# Patient Record
Sex: FEMALE | Race: WHITE | NOT HISPANIC OR LATINO | Employment: UNEMPLOYED | URBAN - METROPOLITAN AREA
[De-identification: names, ages, dates, MRNs, and addresses within clinical notes are randomized per-mention and may not be internally consistent; named-entity substitution may affect disease eponyms.]

---

## 2015-07-02 LAB — HM PAP SMEAR: NORMAL

## 2015-11-03 LAB — HM MAMMOGRAM: NORMAL

## 2017-02-20 ENCOUNTER — TELEPHONE (OUTPATIENT)
Dept: ONCOLOGY | Facility: HOSPITAL | Age: 67
End: 2017-02-20

## 2017-02-20 RX ORDER — ANASTROZOLE 1 MG/1
1 TABLET ORAL DAILY
Qty: 30 TABLET | Refills: 3 | Status: SHIPPED | OUTPATIENT
Start: 2017-02-20 | End: 2017-05-23 | Stop reason: SDUPTHER

## 2017-02-20 NOTE — TELEPHONE ENCOUNTER
----- Message from Amaury Reynolds sent at 2/20/2017  8:51 AM EST -----  Contact: patient   Called refill into pharmacy they have never heard back from us. Only has today's dose left Anastrozole.    Pt needed refill on arimidex. New script sent to [facility] pharmacy. Pt v/u

## 2017-03-30 ENCOUNTER — HOSPITAL ENCOUNTER (OUTPATIENT)
Dept: MAMMOGRAPHY | Facility: HOSPITAL | Age: 67
Discharge: HOME | End: 2017-03-30
Attending: SURGERY | Admitting: SURGERY

## 2017-03-30 ENCOUNTER — TELEPHONE (OUTPATIENT)
Dept: SURGERY | Facility: CLINIC | Age: 67
End: 2017-03-30

## 2017-03-30 DIAGNOSIS — Z80.3 FH: BREAST CANCER: ICD-10-CM

## 2017-03-30 DIAGNOSIS — N64.89 RADIAL SCAR OF BREAST: ICD-10-CM

## 2017-03-30 DIAGNOSIS — C50.311 MALIGNANT NEOPLASM OF LOWER-INNER QUADRANT OF RIGHT FEMALE BREAST (HCC): ICD-10-CM

## 2017-03-30 DIAGNOSIS — N60.12 DIFFUSE CYSTIC MASTOPATHY, LEFT: ICD-10-CM

## 2017-03-30 PROCEDURE — G0202 SCR MAMMO BI INCL CAD: HCPCS

## 2017-03-30 NOTE — TELEPHONE ENCOUNTER
Pt cancelled F/U appt with Dr. Chavez due to her losing ins 4-1-17. She states she will call back when it is reinstated.  walkerg

## 2017-04-03 ENCOUNTER — TELEPHONE (OUTPATIENT)
Dept: SURGERY | Facility: CLINIC | Age: 67
End: 2017-04-03

## 2017-04-11 ENCOUNTER — TELEPHONE (OUTPATIENT)
Dept: ONCOLOGY | Facility: HOSPITAL | Age: 67
End: 2017-04-11

## 2017-04-11 NOTE — TELEPHONE ENCOUNTER
PT CALLING FOR SAMPLES OF ARIMIDEX. SET ASIDE SAMPLE FOR THE PT AND SHE WAS MADE AWARE. PT HAS LOST HER INS COVERAGE AS OF MARCH 31ST. ALSO SUGGESTED THAT PT TALK TO OUR . PT V/U.

## 2017-04-11 NOTE — TELEPHONE ENCOUNTER
----- Message from Kiana Ambriz sent at 4/11/2017  2:17 PM EDT -----   Pt wants to know if we have samples of anastrozole until she can get her insurance back      [phone]

## 2017-05-18 RX ORDER — ALENDRONATE SODIUM 70 MG/1
TABLET ORAL
Qty: 12 TABLET | Refills: 3 | Status: SHIPPED | OUTPATIENT
Start: 2017-05-18 | End: 2017-05-23 | Stop reason: SDUPTHER

## 2017-05-23 ENCOUNTER — LAB (OUTPATIENT)
Dept: LAB | Facility: HOSPITAL | Age: 67
End: 2017-05-23

## 2017-05-23 ENCOUNTER — OFFICE VISIT (OUTPATIENT)
Dept: ONCOLOGY | Facility: CLINIC | Age: 67
End: 2017-05-23

## 2017-05-23 VITALS
SYSTOLIC BLOOD PRESSURE: 110 MMHG | OXYGEN SATURATION: 96 % | DIASTOLIC BLOOD PRESSURE: 78 MMHG | WEIGHT: 187.4 LBS | BODY MASS INDEX: 28.08 KG/M2 | TEMPERATURE: 98.7 F | HEART RATE: 82 BPM

## 2017-05-23 DIAGNOSIS — M85.80 OSTEOPENIA: ICD-10-CM

## 2017-05-23 DIAGNOSIS — C50.311 MALIGNANT NEOPLASM OF LOWER-INNER QUADRANT OF RIGHT FEMALE BREAST (HCC): Primary | ICD-10-CM

## 2017-05-23 DIAGNOSIS — D72.819 CHRONIC LEUKOPENIA: ICD-10-CM

## 2017-05-23 LAB
ALBUMIN SERPL-MCNC: 4.4 G/DL (ref 3.5–5.2)
ALBUMIN/GLOB SERPL: 1.5 G/DL
ALP SERPL-CCNC: 80 U/L (ref 40–129)
ALT SERPL W P-5'-P-CCNC: 44 U/L (ref 5–33)
ANION GAP SERPL CALCULATED.3IONS-SCNC: 12.5 MMOL/L
AST SERPL-CCNC: 36 U/L (ref 5–32)
BASOPHILS # BLD AUTO: 0.01 10*3/MM3 (ref 0–0.2)
BASOPHILS NFR BLD AUTO: 0.2 % (ref 0–2)
BILIRUB SERPL-MCNC: 0.7 MG/DL (ref 0.2–1.2)
BUN BLD-MCNC: 13 MG/DL (ref 8–23)
BUN/CREAT SERPL: 14.6 (ref 7–25)
CALCIUM SPEC-SCNC: 10 MG/DL (ref 8.8–10.5)
CHLORIDE SERPL-SCNC: 98 MMOL/L (ref 98–107)
CHOLEST SERPL-MCNC: 227 MG/DL (ref 0–200)
CO2 SERPL-SCNC: 27.5 MMOL/L (ref 22–29)
CREAT BLD-MCNC: 0.89 MG/DL (ref 0.57–1)
DEPRECATED RDW RBC AUTO: 41.1 FL (ref 37–54)
EOSINOPHIL # BLD AUTO: 0.11 10*3/MM3 (ref 0.1–0.3)
EOSINOPHIL NFR BLD AUTO: 2.5 % (ref 0–4)
ERYTHROCYTE [DISTWIDTH] IN BLOOD BY AUTOMATED COUNT: 12.7 % (ref 11.5–14.5)
GFR SERPL CREATININE-BSD FRML MDRD: 64 ML/MIN/1.73
GLOBULIN UR ELPH-MCNC: 2.9 GM/DL
GLUCOSE BLD-MCNC: 116 MG/DL (ref 65–99)
HCT VFR BLD AUTO: 39.3 % (ref 37–47)
HDLC SERPL-MCNC: 53 MG/DL (ref 40–60)
HGB BLD-MCNC: 13.6 G/DL (ref 12–16)
IMM GRANULOCYTES # BLD: 0.01 10*3/MM3 (ref 0–0.03)
IMM GRANULOCYTES NFR BLD: 0.2 % (ref 0–0.5)
LDLC SERPL CALC-MCNC: 137 MG/DL (ref 0–100)
LDLC/HDLC SERPL: 2.58 {RATIO}
LYMPHOCYTES # BLD AUTO: 1.14 10*3/MM3 (ref 0.6–4.8)
LYMPHOCYTES NFR BLD AUTO: 25.6 % (ref 20–45)
MCH RBC QN AUTO: 30.8 PG (ref 27–31)
MCHC RBC AUTO-ENTMCNC: 34.6 G/DL (ref 31–37)
MCV RBC AUTO: 89.1 FL (ref 81–99)
MONOCYTES # BLD AUTO: 0.34 10*3/MM3 (ref 0–1)
MONOCYTES NFR BLD AUTO: 7.6 % (ref 3–8)
NEUTROPHILS # BLD AUTO: 2.84 10*3/MM3 (ref 1.5–8.3)
NEUTROPHILS NFR BLD AUTO: 63.9 % (ref 45–70)
NRBC BLD MANUAL-RTO: 0 /100 WBC (ref 0–0)
PLATELET # BLD AUTO: 281 10*3/MM3 (ref 140–500)
PMV BLD AUTO: 9.2 FL (ref 7.4–10.4)
POTASSIUM BLD-SCNC: 4.9 MMOL/L (ref 3.5–5.2)
PROT SERPL-MCNC: 7.3 G/DL (ref 6–8.5)
RBC # BLD AUTO: 4.41 10*6/MM3 (ref 4.2–5.4)
SODIUM BLD-SCNC: 138 MMOL/L (ref 136–145)
TRIGL SERPL-MCNC: 186 MG/DL (ref 0–150)
VLDLC SERPL-MCNC: 37.2 MG/DL (ref 7–27)
WBC NRBC COR # BLD: 4.45 10*3/MM3 (ref 4.8–10.8)

## 2017-05-23 PROCEDURE — 80061 LIPID PANEL: CPT

## 2017-05-23 PROCEDURE — 80053 COMPREHEN METABOLIC PANEL: CPT

## 2017-05-23 PROCEDURE — 85025 COMPLETE CBC W/AUTO DIFF WBC: CPT

## 2017-05-23 PROCEDURE — 99213 OFFICE O/P EST LOW 20 MIN: CPT | Performed by: INTERNAL MEDICINE

## 2017-05-23 PROCEDURE — 36415 COLL VENOUS BLD VENIPUNCTURE: CPT

## 2017-05-23 RX ORDER — HYDROCODONE BITARTRATE AND ACETAMINOPHEN 5; 325 MG/1; MG/1
1 TABLET ORAL
COMMUNITY

## 2017-05-23 RX ORDER — ALENDRONATE SODIUM 70 MG/1
70 TABLET ORAL
Qty: 12 TABLET | Refills: 3 | Status: SHIPPED | OUTPATIENT
Start: 2017-05-23

## 2017-05-23 RX ORDER — ANASTROZOLE 1 MG/1
1 TABLET ORAL DAILY
Qty: 90 TABLET | Refills: 3 | Status: SHIPPED | OUTPATIENT
Start: 2017-05-23

## 2017-05-23 RX ORDER — LOVASTATIN 20 MG/1
20 TABLET ORAL
COMMUNITY
End: 2017-07-06

## 2017-06-08 ENCOUNTER — TELEPHONE (OUTPATIENT)
Dept: SURGERY | Facility: CLINIC | Age: 67
End: 2017-06-08

## 2017-07-06 ENCOUNTER — OFFICE VISIT (OUTPATIENT)
Dept: SURGERY | Facility: CLINIC | Age: 67
End: 2017-07-06

## 2017-07-06 VITALS
WEIGHT: 187.6 LBS | OXYGEN SATURATION: 99 % | TEMPERATURE: 98.9 F | DIASTOLIC BLOOD PRESSURE: 89 MMHG | SYSTOLIC BLOOD PRESSURE: 137 MMHG | HEART RATE: 103 BPM | HEIGHT: 69 IN | BODY MASS INDEX: 27.78 KG/M2

## 2017-07-06 DIAGNOSIS — C50.311 MALIGNANT NEOPLASM OF LOWER-INNER QUADRANT OF RIGHT FEMALE BREAST (HCC): Primary | ICD-10-CM

## 2017-07-06 DIAGNOSIS — N64.89 RADIAL SCAR OF BREAST: ICD-10-CM

## 2017-07-06 DIAGNOSIS — Z80.3 FH: BREAST CANCER: ICD-10-CM

## 2017-07-06 DIAGNOSIS — N60.19 DIFFUSE CYSTIC MASTOPATHY, UNSPECIFIED LATERALITY: ICD-10-CM

## 2017-07-06 PROCEDURE — 99213 OFFICE O/P EST LOW 20 MIN: CPT | Performed by: SURGERY

## 2017-07-06 RX ORDER — LANSOPRAZOLE 15 MG/1
15 CAPSULE, DELAYED RELEASE ORAL DAILY
COMMUNITY

## 2017-07-06 NOTE — PROGRESS NOTES
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 changed in size.    She has noted no other changes in her breast exam. No other new masses, skin changes,  other nipple changes, nipple discharge either breast.   She denies headache, bone pain, belly pain, cough, changes in vision or gait.    She has gained 7 # in the interim.      In the interim,  Barbara Sweet  has done well.  She has noted no changes in her breast exam. No new masses, skin changes, nipple changes, nipple discharge either breast.   She denies headache, bone pain, belly pain, cough, changes in vision or gait.    She reported some dissatisfaction with her nipple still having some retraction in the sitting position, and Dr. Waterhouse thought that doing any additional revisionary surgery would be unfruitful, and potentially worsen matters.    She has gained 2 pounds in the interim, weight is 176 today.    Her most recent imaging includes the following: Ordered for new palpable area of tenderness right breast.    Bilateral diagnostic mammogram and right breast ultrasound [facility] March 29, 2016.  Showed scattered fibroglandular densities a seroma and the central third of the breast deep to the nipple.  On ultrasound 3.3 cm seroma at the area of interest.  BI-RADS 2.      Interval History:  In the  interim,  Barbara Sweet  has done well.  She has noted no changes in her breast exam. No new masses, skin changes, nipple changes, nipple discharge either breast.   She denies headache, bone pain, belly pain, cough, changes in vision or gait.    Her most recent imaging includes the following:  Bilateral screening mammogram [facility] March 30, 2017. Right breast postoperative fluid collection smaller and less dense. Benign. BI-RADS 2    She has gained 11 pounds.  She is amidst a divorce.  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  She continues the Arimidex and Fosamax.    Review of Systems:  Review of Systems   Constitutional: Positive for unexpected weight change (11.6 lb wt gain).   All other systems reviewed and are negative.       Past Medical and Surgical History:  Breast Biopsy History:  Patient has not had a breast biopsy in the past.  Breast Cancer HIstory:  Patient does not have a past medical history of breast cancer.  Breast Operations, and year:  none  Obstetric/Gynecologic History:  Age menstrual periods began: 11-12 yrs old   Patient is postmenopausal, entered menopause naturally at age: 44   Number of pregnancies: 3  Number of live births: 0  Number of abortions or miscarriages: 3  Age of delivery of first child: n/a  breast feeding: n/a  Length of time taking birth control pills: 5-10yrs   Patient took hormone replacement during the following dates:  10 yrs- prempro, stopped 2003  The patient still has her uterus and ovaries.      Past Surgical History:   Procedure Laterality Date   • BREAST BIOPSY     • BREAST LUMPECTOMY     • EYE SURGERY     • PROCEDURE GENERIC CONVERTED  06/2014    Port-A-Cath   • TUBAL ABDOMINAL LIGATION         Past Medical History:   Diagnosis Date   • Asthma    • Breast cancer 2013    Stage IIA, right   • DDD (degenerative disc disease), lumbar    • Diffuse cystic mastopathy 5/3/2016   • Drug therapy    •  "Fibroids    • Fibromyalgia    • GERD (gastroesophageal reflux disease)    • H/O infectious mononucleosis    • H/O Polymyalgia    • Headache    • Heart disease    • Hot flashes    • Hyperlipidemia    • Hypertension    • Memory loss    • Radiation        Prior Hospitalizations, other than for surgery or childbirth, and year:  3 yrs ago arm pain    Social History     Social History   • Marital status:      Spouse name: Get   • Number of children: N/A   • Years of education: College     Occupational History   •  Unemployed     Social History Main Topics   • Smoking status: Never Smoker   • Smokeless tobacco: Never Used   • Alcohol use No   • Drug use: No   • Sexual activity: Defer     Other Topics Concern   • Not on file     Social History Narrative     Patient is .  Patient is a homemaker.  Patient drinks 2-4 servings of caffeine per day.     Family History:  Family History   Problem Relation Age of Onset   • Stroke Other    • Hypertension Other    • Heart failure Other    • Heart disease Mother    • Hypertension Mother    • Diabetes Mother    • Heart disease Father    • Hypertension Father    • Diabetes Father    • Heart disease Sister    • Hypertension Sister    • Diabetes Sister    • Breast cancer Sister      In her late 70s   • Cancer Brother      Several; had smoking-related malignancies, throat, lung   • Heart disease Brother    • Hypertension Brother    • Diabetes Brother        Vital Signs:  /89 (BP Location: Left arm, Patient Position: Sitting, Cuff Size: Adult)  Pulse 103  Temp 98.9 °F (37.2 °C) (Temporal Artery )   Ht 68.5\" (174 cm)  Wt 187 lb 9.6 oz (85.1 kg)  SpO2 99%  BMI 28.11 kg/m2     Medications:    Current Outpatient Prescriptions:   •  alendronate (FOSAMAX) 70 MG tablet, Take 1 tablet by mouth Every 7 (Seven) Days., Disp: 12 tablet, Rfl: 3  •  anastrozole (ARIMIDEX) 1 MG tablet, Take 1 tablet by mouth Daily., Disp: 90 tablet, Rfl: 3  •  Calcium Carbonate-Vitamin D (CALCIUM " + D PO), Take  by mouth., Disp: , Rfl:   •  Cholecalciferol (VITAMIN D3) 1000 UNITS capsule, Take  by mouth., Disp: , Rfl:   •  gabapentin (NEURONTIN) 300 MG capsule, , Disp: , Rfl:   •  HYDROcodone-acetaminophen (NORCO) 5-325 MG per tablet, Take 1 tablet by mouth., Disp: , Rfl:   •  lansoprazole (PREVACID) 15 MG capsule, Take 15 mg by mouth Daily., Disp: , Rfl:   •  meloxicam (MOBIC) 15 MG tablet, , Disp: , Rfl:   •  Multiple Vitamins-Minerals (HAIR/SKIN/NAILS PO), Take  by mouth., Disp: , Rfl:   •  mupirocin (BACTROBAN) 2 % ointment, , Disp: , Rfl:   •  vitamin E 400 UNIT capsule, Take 400 Units by mouth daily., Disp: , Rfl:      Allergies:  Allergies   Allergen Reactions   • Sulphadimidine [Sulfamethazine] Swelling       Physical Examination:  General Appearance:  Patient is in no distress.  She is well kept and has an average  build.   Psychiatric:  Patient with appropriate mood and affect. Alert and oriented to self, time, and place.    Breast, RIGHT: large  sized, symmetric with the contralateral side.   Breast skin is without erythema, edema, rashes.  There are no visible abnormalities upon inspection during the arm-raising maneuver or with hands on hips in the sitting position. There is no nipple discharge. There is an asymmetric thickening of the breast near the 11:30 outer ring location that is radial in orientation, and approximately 4 x 1 cm, without well defined margins.  This is stable. There are no masses palpable in the sitting or supine positions. There is a well healed radial incision at the RIGHT breast 6:00 location from her 8-2013 lumpectomy. The previous central retraction of the breast from radiation and lumpectomy has been surgically improved 5-2015 Dr Waterhouse. There is generalized thickening of the NAC, especially inferiorly.  The central inferior lower inner quadrant retraction is more pronounced in the sitting position as compared with the supine position.  Her nipple papule has better  projection since the revision.    Breast, LEFT:  large  sized, asymmetric with the contralateral side.  Breast skin is without erythema, edema, rashes.  There are no visible abnormalities upon inspection during the arm-raising maneuver or with hands on hips in the sitting position. There is no nipple retraction, discharge or nipple/areolar skin changes.There are no masses palpable in the sitting or supine positions.  There is a well healed periareolar incision at the LEFT  breast UIQ location from her  excision radial scar.     Lymphatic:  There is no axillary, cervical, infraclavicular, or supraclavicular adenopathy bilaterally.     Eyes:  Pupils are round and reactive to light.  Cardiovascular:  Heart rate and rhythm are regular.  Respiratory:  Lungs are clear bilaterally with no crackles or wheezes in any lung field.  Gastrointestinal:  Abdomen is soft, nondistended, and nontender.  Musculoskeletal:  Good strength in all 4 extremities.  There is good range of motion in both shoulders.   Skin:  No new skin lesions or rashes on the skin excluding the breast (see breast exam above).        Imagin/02/10       [facility]        Screening Mammogram      Barbara Sweet   Scattered fibroglandular densities.   BI-RADS Category 2: Benign Finding     13       [facility]        Screening Mammography        Barbara Sweet   The patient is an asymptomatic 62-year old female.   Heterogeneously dense. There is a new micro lobulated mass-like density in the retroareolar right breast measuring approximately 2.2 cm in diameter.   There are, new tightly clustered calcifications in rhe anterior upper outer quadrant of the left breast.   IMPRESSION:   BIRADS 0: Needs additional imaging.     13      [facility]        Bilateral Diag Mammo and Bilateral US       Barbara Sweet   Right Breast. The macro-lobulated mass described on the screening mammogram persists on the diagnostic mammogram.  Measures 2.3 x 1.6 x 1.8 cm.   There are some associated macro-calcifications. The mass is located in the central right breast, just below the nipple at the 6 o'clock position. In the anterior one-third of the glandular tissue, 5 cm posterior to the nipple. No axillary lymphadenopathy.   Left Breast. The new calcifications upper outer quadrant of the left breast are technically indeterminate.   The ultrasound findings showed 2 hypoechoic areas near the nipple. One of the lesions is visible on the mammogram, measuring 0.7cm x 0.4 cm x 0.6 cm. This is located in the lateral left breast approximately 3 cm from the nipple. The 2nf area seen on ultrasound is not clearly depicted on the mammogram.   RIGHT BREAST ULTRASOUND:  Malignant appearing mass in the right breast at the 3 o'clock position, 1-2 cm from the nipple. The mass measures 1.9 x 1.5 x 2.2cm.   Suspicious for breast malignancy.   LEFT BREAST ULTRASOUND:   Microcalcifications were not visualized on and there are no associated masses in the upper outer quadrant of the left breast. However, the patient did have 2 additional areas of the left breast6 that are suspicious. At the 12 o'clock poisson near the nipple, there is a 0.6 cm hypoechoic lesion that is technically indeterminate.   Additionally, there is a small hypoechoic and shadowing focus at the 3:30 position, near the nipple, measuring 0.9 x 0.7 x 0.6 cm.   IMPRESSION:   A 2.2 cm mass in the inferior right breast. Imaging features suspicious for a breast malignancy.   New cluster calcifications in the upper outer quadrant of the left breast are suspicious.   Two hypoechoic areas measuring less than a cm in the left breast, near the nipple are indeterminate and should also undergo biopsy. Ultrasound guided biopsy.   BI-RADS Category 4      07/14/13       [facility]       Bilateral Breast MRI         Barbara Sweet   IMPRESSION:  Biopsy proven malignancy in the right breast in a retroareolar location approximately  4.3 cm posterior to the nipple. The mass measures on the order of 2.2 cm in its greatest dimension with an associated metallic clip along the posterior margin. No other suspicious findings are seen within the right breast and there is no evidence for right axillary adenopathy.   There are no findings suspicious for malignancy in the left breast.   BIRADS Category 6: Known malignancy      07/15/13      [facility]      Unilateral Left Breast Sonography       Kee, Barbara   Targeted sonographic evaluation left breast 2 o'clock through 4 o'clock positions.   No sonographic abnormality at this location is appreciated.   IMPRESSION: Negative targeted left breast sonography.   BIRADS Category 6    03/10/14    [facility]       Chest and Thoracic Spine         Kee, Barbara   FINDINGS: Upright PA and lateral views of the chest.   A left subclavical approach Mediport catheter terminates in the superior vena cava.   THORACIC SPINE:  IMPRESSION: Negative thoracic spine except for multilevel degenerative osteophytic spurring in the mid and lower thoracic spine.     03/10/14    [facility]     Whole body bone scan        Kee, Barbara  There is only minimal increased uptake in the mid thoracic region that is easily accounted for by degenerative disk changes that are evident on the x-rays.   IMPRESSION-No evidence of osseous metastatic disease.     03/17/14     [facility]       Lumbar Spine Series, Five views       Kee, Barbara   IMPRESSION-  Degenerative changes, most pronounced at L5-S1. No focal bone lesion nor other significant abnormality.     05/28/14        [facility]          Right Diagnostic Mammography            Kee, Barbara   FINDINGS: Scattered fibroglandular densities. There is postsurgical architectural distortion seen within the retroareolar region of the right breast. I see no masses of areas of nonpostsurgical distortion in right breast.   No suspicious findings in the right breast.   BIRADS Category 3      0703/14              [facility]              Mammo Screening Bilateral              Elizabeth, Randy   Scattered fibroglandular densities. Oval circumscribed mass that measures 5.9 cm seen within the middle third retroareolar region of the right breast. Consistent with a postoperative seroma cavity.   Sonographic evaluation of the entirety of both breasts was performed. Moderately large seroma cavity that measures on the order of 4 cm in its greatest dimension.   BIRADS Category 2: Benign     03/27/15       [facility]         Screening Mammogram                   Elizabeth, Randy A  Findings  decreasing density at the site of right lumpectomy.  There is scarring at this location.  Within the right breast there is a 7 ?  asymmetric density which may  be related to superimposition of normal fibroglandular elements.  Birads Category 0    04/06/15            [facility]              UNILATERAL DIAG RT MAMMOGRAM              Elizabeth, Randy  FINDINGS: Additional spot compression and rolled views right breast performed at the area of interest. the nodular opacity is much less conspicuous.   BI-RADS category 2: Benign     07/07/15             [facility]        ULTRASOUND RIGHT AXILLA             Elizabeth, Randy   CONCLUSION: Negative ultrasound evaluation of the right axilla.   BIRADS 1.       11/03/15                 [facility]           RIGHT DIAGNOSTIC MAMMOGRAPHY AND UNILATERAL RIGHT BREAST SONOGRAPHY       Elizabeth, Randy     area of palpable concern right breast.   Scattered fibroglandular densities. Stable postsurgical architectural distortion. A triangular skin marker area of palpable concern right breast 12 o'clock position. No underlying mammographic abnormality.   ULTRASOUND   Right breast palpable concern 12 o'clock 8 cm from the nipple. No sonographic abnormality is appreciated.   BIRADS Category 2: Benign     3-29-16   [facility]  DIAGNOSTIC BILATERAL MAMM0  ELIZABETH, RANDY  Clinical Information:  New palpable area of tenderness.  Scattered fibroglandular densities bilaterally.  Ultrasound of the right  "breast at the area of interest demonstrates a well-defined fluid collection.  3.3 cm in length and 2.7 cm in width compatible seroma.  BIRADS CATEGORY:  2 Benign    Bilateral screening mammogram [facility] March 30, 2017. Right breast postoperative fluid collection smaller and less dense. Benign. BI-RADS 2    Pathology:  Reported: 7/11/2013   PERCUTANEOUS ULTRASOUND-GUIDED VACUUM ASSISTED CORE BREAST BIOPSY   Final Diagnosis  1: RIGHT BREAST \"MASS 4 O'CLOCK AREOLAR MARGIN\", NEEDLE BIOPSIES:       INVASIVE DUCTAL CARCINOMA.       PREDICTED ERIC HISTOLOGIC GRADE 1 (TUBULES=2, NUCLEAR          PLEOMORPHISM=2, MITOSES=1).       INVASIVE CARCINOMA PRESENT IN FOUR FRAGMENTS, MAXIMUM SPAN 1.1 CM.       HORMONE RECEPTOR STUDIES AND Ki-67 STUDIES PENDING (PER REQUEST).         2: LEFT BREAST \"MASS 10 O'CLOCK AREOLAR MARGIN\", NEEDLE BIOPSIES:       FIBROCYSTIC CHANGE.       NON-ATYPICAL DUCT HYPERPLASIA.       CYST FORMATION WITH APOCRINE METAPLASIA.       FOCAL NON-ATYPICAL COLUMNAR CELL ALTERATION.       FRAGMENTS OF RADIAL SCLEROSING LESION, 4 MM MAXIMUM DIMENSION.       7/22/2013   LT STEREOTACTIC BREAST BIOPSY   Final Diagnosis  BREAST, LEFT, STEREOTACTIC BIOPSY:       NUMEROUS FRAGMENTS OF BENIGN BREAST TISSUE WITH FOCAL AND MINIMAL            FIBROCYSTIC CHANGES AND FOCAL COLUMNAR CELL CHANGE WITHOUT ATYPIA.       MICROSCOPIC FOCUS OF FIBROADENOMATOID CHANGE WITH SCLEROSIS.       NUMEROUS CALCIFICATIONS PRESENT IN ASSOCIATION WITH BENIGN BREAST TISSUE.       NO EVIDENCE OF ATYPIA OR MALIGNANCY.      7-31-13- RT BREAST CA, LT BREAST RADIAL SCAR   RT BREAST LUMPECTOMY, RT DEEP AXILLARY SENTINEL NODE BIOPSY, LT BREAST MAMMO NEEDLE LOC EXCISIONAL BIOPSY     Final Diagnosis  1: \"RIGHT RETROAREOLAR TISSUE\":       BENIGN BREAST TISSUE.    2: \"LEFT BREAST EXCISIONAL BIOPSY\", LUMPECTOMY, WIRE-GUIDED:       COMPLEX SCLEROSING LESION, BENIGN (0.4 CM).       MILD FIBROCYSTIC CHANGES WITH FOCAL NON-ATYPICAL DUCT " "HYPERPLASIA.       BIOPSY SITE CHANGE INCLUDING CAVITARY CHANGE PRESENT FOCALLY AT INKED  INFERIOR MARGIN.       MARGINS  NO ATYPIA SEEN.    3: \"RIGHT BREAST LUMPECTOMY\":       SPECIMEN/PROCEDURE/LATERALITY: RIGHT BREAST LUMPECTOMY.       LYMPH NODE SAMPLING: SENTINEL LYMPH NODES (SPECIMENS 8, 9 AND 10).         HISTOLOGIC TYPE: INVASIVE DUCTAL CARCINOMA.       HISTOLOGIC GRADE: INTERMEDIATE GRADE.            ERIC SCORE: 7.            TUBULES: SCORE 3.            NUCLEI: SCORE 2.            MITOSES: SCORE 2.       TUMOR SIZE: 2.1 x 1.9 x 1.7 CM.       DUCTAL CARCINOMA IN SITU: NOT DEFINITIVELY IDENTIFIED.       MARGINS: UNINVOLVED.       DISTANCE OF INVASIVE CARCINOMA FROM CLOSEST MARGIN: 1.5 MM (SUPERIOR).                 NOTE: New superior margin submitted as specimen #4.       DISTANCE OF IN SITU CARCINOMA FROM CLOSEST MARGIN: NOT APPLICABLE.       LYMPH NODES:            NUMBER OF SENTINEL LYMPH NODES: 3.            TOTAL NUMBER OF LYMPH NODES EXAMINED (SENTINEL & NON-SENTINEL): 3.            NUMBER OF LYMPH NODES WITH MACROMETASTASES: 0.            NUMBER OF LYMPH NODES WITH MICROMETASTASES: 0.            NUMBER OF LYMPH NODES WITH ISOLATED TUMOR CELLS: 0.            NUMBER OF LYMPH NODES WITHOUT TUMOR CELLS: 3.            METHOD OF EVALUATION OF SENTINEL NODES: H & E STAINS, MULTIPLE STEP  SECTIONS.       PATHOLOGIC STAGING: T2, N0 (SN), M not applicable.            CLINICAL AND RADIOGRAPHIC CORRELATION REQUIRED FOR FINAL STAGING        AS DETERMINED BY THETREATING PHYSICIAN.       ANCILLARY STUDIES:             HORMONE RECEPTORS, HER2: PERFORMED ON PRIOR BIOPSY,                  [facility] [id number].            RESULTS: OTHER  SEE PRIOR REPORT.       ADDITIONAL PATHOLOGIC FINDINGS: BENIGN COMPLEX SCLEROSING LESION.       BACKGROUND OF FIBROCYSTIC CHANGES. BENIGN SKIN.    4: \"RIGHT SUPERIOR MARGINS\":       FOCAL NON-ATYPICAL DUCT HYPERPLASIA.       FIBROCYSTIC CHANGES.       MARGINS  NO " "SIGNIFICANT ATYPIA.    5: \"RIGHT DEEP MARGINS\":       FOCUS OF DUCTAL CARCINOMA-IN-SITU, INTERMEDIATE NUCLEAR GRADE WITH COMEDO  NECROSIS.       COMPLEX SCLEROSING LESION.       MARGINS  NO SIGNIFICANT ATYPIA (DCIS EXTENDS TO WITHIN LESS THAN 0.5 MM       OF DESIGNATED NEW MARGIN).    6: \"RIGHT MEDIAL MARGINS\":       BENIGN BREAST TISSUE WITH MILD FIBROCYSTIC CHANGES.       FOCAL COLUMNAR CELL CHANGE WITHOUT ATYPIA.       MARGINS  NO SIGNIFICANT ATYPIA.    7: \"RIGHT LATERAL MARGINS\":       FOCAL COLUMNAR CELL CHANGE WITHOUT ATYPIA.       MILD FIBROCYSTIC CHANGES.       MARGINS  NO SIGNIFICANT ATYPIA.    8: \"RIGHT SENTINEL NODE #1\":       LYMPH NODE  MULTIPLE STEP SECTIONS: NO METASTATIC CARCINOMA SEEN.    9: \"RIGHT SENTINEL NODE #2\":       LYMPH NODE  MULTIPLE STEP SECTIONS: NO METASTATIC CARCINOMA SEEN.    10: \"RIGHT SENTINEL NODE #3\":       LYMPH NODE  MULTIPLE STEP SECTIONS: NO METASTATIC CARCINOMA SEEN.    8/9/2013  OPERATION:   RIGHT RE-EXCISION, LUMPECTOMY   Final Diagnosis  RE-EXCISED RIGHT BREAST DEEP MARGIN:       BENIGN BREAST TISSUE WITH SCATTERED AREAS OF FAT NECROSIS            AND MARKED CAUTERY ARTIFACT.       FOCAL MINIMAL DUCT HYPERPLASIA WITHOUT ATYPIA.          04/12/2016             [facility]                 PLASTICS CONSULT             RANDY JOHNSON    She has developed some continued retraction beneath the right nipple areolar complex, and her most recent mammogram and then ultrasound demonstrates a remaining small seroma cavity.  Unfortunately, I don’t think there is any additional surgery that will correct the retracted appearance of the central breast. I think a lot of her concerns are really related to problems separate from her breast surgery. I really don’t think additional surgery is in her best interest.   Maurine Waterhouse, M.D.    Procedures:      Assessment:   Diagnosis Plan   1. Malignant neoplasm of lower-inner quadrant of right female breast     2. Radial scar of breast   "   3. Diffuse cystic mastopathy, unspecified laterality     4. FH: breast cancer         1  RIGHT breast 4:00 areolar margin- BR4- 2.3 cm on mammogram and ultrasound, not palpable  Biopsied 7-10-13- IMC, NST, low grade (t2n2m1), largest 1.1 cm.  ER 25 IN 1-4, her 2 2+ FISH 1.1 negative, Ki 67-- 20.  Clinical stage T2N0M0- stage IIA    7-31-13 RIGHT lumpectomy and SLNB and a LEFT excision radial scar.   1- RIGHT breast IMC, NST, int grade (t3n2m2), 2.1 cm, 0/3 sentinel nodes.  Margins on lumpectomy < 1.5 mm from the superior margin. Reexcised superior margin benign. Reexcised deep m argin with a focus of DCIS < 0.5mm from margin as well as a complex sclerosing lesion. Additional medial and lateral margins benign.   Path stage T2N0- IIA    8-8-13  RIGHT reexcision lumpectomy of the deep margin-  negative for tumor.     - Dr Torres and Lionel- high risk oncotype--4 cycles of T-C  9-30-13 through 12-2-13; started arimidex 1-6-14. On fosamax  -irradiation - 1-2-14 through 2-17-14- whole breast plus boost -Dr nAgulo    - 5-2015 revision retroareolar scarring to reverse nipple retraction that was treatment related, RIGHT. Dr Waterhouse.    2  LEFT breast 10:00 areolar margin- 7mm lesion seen on ultrasound only, not on mammogram and not palpable- BR4  Biopsied 7-10-13 - path returned as FCC, cyst with apocrine metasplasia, focal CCC without atpia, fragments of radial sclerosing lesion, 4mm max diam.    7-31-13- LEFT excisional biopsy complex sclerosing lesion, 4mm, FCC, as well as biopsy site. No atypia      3  LEFT breast UOQ- BR4- stereo 7-22-13- benign hyperplasia and FCC      4  sister age 77 (one of 4 sisters)      Plan:  Barbara Sweet is doing ok today at her 4 year visit. We reviewed her interval history, examination and her interval imaging. There is no evidence of disease on exam or imaging.  We reviewed the stress that she is having related to her divorce.  We discussed her weight gain and how that is likely related  to the stress as well as to her immobility related to her knee issues.  I offered for her to see our nutritionist but she would like to see if she can lose the weight when she is more active.  I asked her to let us know if she changes her mind and would like to see the nutritionist.  She sees Dr. Torres every 6 months.  She is planning to move to [place] with her nephew once her house is supple with the divorce.  She will call and let us know her new address.  I did ask her to request a contact from Dr. Torres for a medical oncologist in [place] who can follow her through her anastrozole treatment.  I gave her a reminder today at her next mammogram will be due April 1, 2018 bilateral screening mammogram with 3-D.  I have not given her a routine follow-up in our office but asked her to call us in the future if we can be of any assistance.      Natalie Chavez MD        We spent 23 min in visit today, 15 in face to face   Next Appointment:  Return for any future concerns.

## 2017-08-25 ENCOUNTER — HOSPITAL ENCOUNTER (OUTPATIENT)
Dept: CARDIOLOGY | Facility: HOSPITAL | Age: 67
Discharge: HOME | End: 2017-08-25
Admitting: NURSE PRACTITIONER

## 2017-08-25 ENCOUNTER — HOSPITAL ENCOUNTER (OUTPATIENT)
Dept: CARDIOLOGY | Facility: HOSPITAL | Age: 67
Discharge: HOME | End: 2017-08-25
Attending: INTERNAL MEDICINE

## 2017-08-25 ENCOUNTER — TRANSCRIBE ORDERS (OUTPATIENT)
Dept: ADMINISTRATIVE | Facility: HOSPITAL | Age: 67
End: 2017-08-25

## 2017-08-25 VITALS
BODY MASS INDEX: 28.04 KG/M2 | HEIGHT: 68 IN | HEART RATE: 84 BPM | OXYGEN SATURATION: 95 % | SYSTOLIC BLOOD PRESSURE: 136 MMHG | DIASTOLIC BLOOD PRESSURE: 86 MMHG | WEIGHT: 185 LBS

## 2017-08-25 VITALS
WEIGHT: 185 LBS | OXYGEN SATURATION: 95 % | HEART RATE: 89 BPM | BODY MASS INDEX: 28.04 KG/M2 | HEIGHT: 68 IN | RESPIRATION RATE: 20 BRPM | SYSTOLIC BLOOD PRESSURE: 151 MMHG | DIASTOLIC BLOOD PRESSURE: 85 MMHG

## 2017-08-25 DIAGNOSIS — R07.9 CHEST PAIN, UNSPECIFIED TYPE: Primary | ICD-10-CM

## 2017-08-25 DIAGNOSIS — R07.9 CHEST PAIN, UNSPECIFIED TYPE: ICD-10-CM

## 2017-08-25 DIAGNOSIS — I20.9 NEW-ONSET ANGINA (HCC): Primary | ICD-10-CM

## 2017-08-25 DIAGNOSIS — R94.31 ABNORMAL EKG: ICD-10-CM

## 2017-08-25 LAB
ALBUMIN SERPL-MCNC: 4.4 G/DL (ref 3.5–5.2)
ALBUMIN/GLOB SERPL: 1.5 G/DL
ALP SERPL-CCNC: 78 U/L (ref 39–117)
ALT SERPL W P-5'-P-CCNC: 37 U/L (ref 1–33)
ANION GAP SERPL CALCULATED.3IONS-SCNC: 11.1 MMOL/L
AST SERPL-CCNC: 32 U/L (ref 1–32)
BASOPHILS # BLD AUTO: 0.01 10*3/MM3 (ref 0–0.2)
BASOPHILS NFR BLD AUTO: 0.3 % (ref 0–1.5)
BILIRUB SERPL-MCNC: 0.4 MG/DL (ref 0.1–1.2)
BUN BLD-MCNC: 13 MG/DL (ref 8–23)
BUN/CREAT SERPL: 11.1 (ref 7–25)
CALCIUM SPEC-SCNC: 9.4 MG/DL (ref 8.6–10.5)
CHLORIDE SERPL-SCNC: 103 MMOL/L (ref 98–107)
CO2 SERPL-SCNC: 26.9 MMOL/L (ref 22–29)
CREAT BLD-MCNC: 1.17 MG/DL (ref 0.57–1)
D DIMER PPP FEU-MCNC: 0.37 MCGFEU/ML (ref 0–0.49)
DEPRECATED RDW RBC AUTO: 42.8 FL (ref 37–54)
EOSINOPHIL # BLD AUTO: 0.16 10*3/MM3 (ref 0–0.7)
EOSINOPHIL NFR BLD AUTO: 4.1 % (ref 0.3–6.2)
ERYTHROCYTE [DISTWIDTH] IN BLOOD BY AUTOMATED COUNT: 12.7 % (ref 11.7–13)
GFR SERPL CREATININE-BSD FRML MDRD: 47 ML/MIN/1.73
GLOBULIN UR ELPH-MCNC: 2.9 GM/DL
GLUCOSE BLD-MCNC: 134 MG/DL (ref 65–99)
HCT VFR BLD AUTO: 36.6 % (ref 35.6–45.5)
HGB BLD-MCNC: 12.8 G/DL (ref 11.9–15.5)
IMM GRANULOCYTES # BLD: 0 10*3/MM3 (ref 0–0.03)
IMM GRANULOCYTES NFR BLD: 0 % (ref 0–0.5)
LYMPHOCYTES # BLD AUTO: 0.9 10*3/MM3 (ref 0.9–4.8)
LYMPHOCYTES NFR BLD AUTO: 23.1 % (ref 19.6–45.3)
MCH RBC QN AUTO: 32.2 PG (ref 26.9–32)
MCHC RBC AUTO-ENTMCNC: 35 G/DL (ref 32.4–36.3)
MCV RBC AUTO: 92.2 FL (ref 80.5–98.2)
MONOCYTES # BLD AUTO: 0.27 10*3/MM3 (ref 0.2–1.2)
MONOCYTES NFR BLD AUTO: 6.9 % (ref 5–12)
NEUTROPHILS # BLD AUTO: 2.56 10*3/MM3 (ref 1.9–8.1)
NEUTROPHILS NFR BLD AUTO: 65.6 % (ref 42.7–76)
NT-PROBNP SERPL-MCNC: 2038 PG/ML (ref 5–900)
PLATELET # BLD AUTO: 230 10*3/MM3 (ref 140–500)
PMV BLD AUTO: 10 FL (ref 6–12)
POTASSIUM BLD-SCNC: 4.2 MMOL/L (ref 3.5–5.2)
PROT SERPL-MCNC: 7.3 G/DL (ref 6–8.5)
RBC # BLD AUTO: 3.97 10*6/MM3 (ref 3.9–5.2)
SODIUM BLD-SCNC: 141 MMOL/L (ref 136–145)
TROPONIN T SERPL-MCNC: <0.01 NG/ML (ref 0–0.03)
WBC NRBC COR # BLD: 3.9 10*3/MM3 (ref 4.5–10.7)

## 2017-08-25 PROCEDURE — 80053 COMPREHEN METABOLIC PANEL: CPT | Performed by: NURSE PRACTITIONER

## 2017-08-25 PROCEDURE — 93010 ELECTROCARDIOGRAM REPORT: CPT | Performed by: NURSE PRACTITIONER

## 2017-08-25 PROCEDURE — 93325 DOPPLER ECHO COLOR FLOW MAPG: CPT | Performed by: INTERNAL MEDICINE

## 2017-08-25 PROCEDURE — 93325 DOPPLER ECHO COLOR FLOW MAPG: CPT

## 2017-08-25 PROCEDURE — 93320 DOPPLER ECHO COMPLETE: CPT

## 2017-08-25 PROCEDURE — 99204 OFFICE O/P NEW MOD 45 MIN: CPT | Performed by: NURSE PRACTITIONER

## 2017-08-25 PROCEDURE — 93350 STRESS TTE ONLY: CPT | Performed by: INTERNAL MEDICINE

## 2017-08-25 PROCEDURE — 93320 DOPPLER ECHO COMPLETE: CPT | Performed by: INTERNAL MEDICINE

## 2017-08-25 PROCEDURE — 93017 CV STRESS TEST TRACING ONLY: CPT

## 2017-08-25 PROCEDURE — 94760 N-INVAS EAR/PLS OXIMETRY 1: CPT

## 2017-08-25 PROCEDURE — 93016 CV STRESS TEST SUPVJ ONLY: CPT | Performed by: INTERNAL MEDICINE

## 2017-08-25 PROCEDURE — 85025 COMPLETE CBC W/AUTO DIFF WBC: CPT | Performed by: NURSE PRACTITIONER

## 2017-08-25 PROCEDURE — 83880 ASSAY OF NATRIURETIC PEPTIDE: CPT | Performed by: NURSE PRACTITIONER

## 2017-08-25 PROCEDURE — 93352 ADMIN ECG CONTRAST AGENT: CPT | Performed by: INTERNAL MEDICINE

## 2017-08-25 PROCEDURE — C8928 TTE W OR W/O FOL W/CON,STRES: HCPCS

## 2017-08-25 PROCEDURE — 85379 FIBRIN DEGRADATION QUANT: CPT | Performed by: NURSE PRACTITIONER

## 2017-08-25 PROCEDURE — 93018 CV STRESS TEST I&R ONLY: CPT | Performed by: INTERNAL MEDICINE

## 2017-08-25 PROCEDURE — 84484 ASSAY OF TROPONIN QUANT: CPT | Performed by: NURSE PRACTITIONER

## 2017-08-25 PROCEDURE — 25010000002 PERFLUTREN (DEFINITY) 8.476 MG IN SODIUM CHLORIDE 10 ML INJECTION: Performed by: INTERNAL MEDICINE

## 2017-08-25 PROCEDURE — 36415 COLL VENOUS BLD VENIPUNCTURE: CPT

## 2017-08-25 PROCEDURE — 93005 ELECTROCARDIOGRAM TRACING: CPT | Performed by: NURSE PRACTITIONER

## 2017-08-25 RX ORDER — SODIUM CHLORIDE 0.9 % (FLUSH) 0.9 %
10 SYRINGE (ML) INJECTION AS NEEDED
Status: SHIPPED | OUTPATIENT
Start: 2017-08-25

## 2017-08-25 RX ORDER — NITROGLYCERIN 0.4 MG/1
0.4 TABLET SUBLINGUAL
Status: SHIPPED | OUTPATIENT
Start: 2017-08-25

## 2017-08-25 RX ADMIN — PERFLUTREN 3 ML: 6.52 INJECTION, SUSPENSION INTRAVENOUS at 16:23

## 2017-08-28 LAB
BH CV ECHO MEAS - ACS: 2.1 CM
BH CV ECHO MEAS - AO MAX PG: 6.4 MMHG
BH CV ECHO MEAS - AO ROOT AREA (BSA CORRECTED): 1.4
BH CV ECHO MEAS - AO ROOT AREA: 6 CM^2
BH CV ECHO MEAS - AO ROOT DIAM: 2.8 CM
BH CV ECHO MEAS - AO V2 MAX: 126.7 CM/SEC
BH CV ECHO MEAS - BSA(HAYCOCK): 2 M^2
BH CV ECHO MEAS - BSA: 2 M^2
BH CV ECHO MEAS - BZI_BMI: 28.1 KILOGRAMS/M^2
BH CV ECHO MEAS - BZI_METRIC_HEIGHT: 172.7 CM
BH CV ECHO MEAS - BZI_METRIC_WEIGHT: 83.9 KG
BH CV ECHO MEAS - CONTRAST EF (2CH): 78.8 ML/M^2
BH CV ECHO MEAS - CONTRAST EF 4CH: 57.9 ML/M^2
BH CV ECHO MEAS - EDV(MOD-SP2): 80 ML
BH CV ECHO MEAS - EDV(MOD-SP4): 121 ML
BH CV ECHO MEAS - EDV(TEICH): 101.7 ML
BH CV ECHO MEAS - EF(CUBED): 80.3 %
BH CV ECHO MEAS - EF(MOD-SP2): 78.8 %
BH CV ECHO MEAS - EF(MOD-SP4): 57.9 %
BH CV ECHO MEAS - EF(TEICH): 72.8 %
BH CV ECHO MEAS - ESV(MOD-SP2): 17 ML
BH CV ECHO MEAS - ESV(MOD-SP4): 51 ML
BH CV ECHO MEAS - ESV(TEICH): 27.7 ML
BH CV ECHO MEAS - FS: 41.8 %
BH CV ECHO MEAS - IVS/LVPW: 0.96
BH CV ECHO MEAS - IVSD: 1 CM
BH CV ECHO MEAS - LAT PEAK E' VEL: 9 CM/SEC
BH CV ECHO MEAS - LV DIASTOLIC VOL/BSA (35-75): 61.2 ML/M^2
BH CV ECHO MEAS - LV MASS(C)D: 167.7 GRAMS
BH CV ECHO MEAS - LV MASS(C)DI: 84.8 GRAMS/M^2
BH CV ECHO MEAS - LV SYSTOLIC VOL/BSA (12-30): 25.8 ML/M^2
BH CV ECHO MEAS - LVIDD: 4.7 CM
BH CV ECHO MEAS - LVIDS: 2.7 CM
BH CV ECHO MEAS - LVLD AP2: 7.2 CM
BH CV ECHO MEAS - LVLD AP4: 8.2 CM
BH CV ECHO MEAS - LVLS AP2: 5.2 CM
BH CV ECHO MEAS - LVLS AP4: 6.9 CM
BH CV ECHO MEAS - LVPWD: 1 CM
BH CV ECHO MEAS - MED PEAK E' VEL: 6 CM/SEC
BH CV ECHO MEAS - MR MAX PG: 19.9 MMHG
BH CV ECHO MEAS - MR MAX VEL: 222.9 CM/SEC
BH CV ECHO MEAS - MV A DUR: 0.12 SEC
BH CV ECHO MEAS - MV A MAX VEL: 69.5 CM/SEC
BH CV ECHO MEAS - MV DEC SLOPE: 255.9 CM/SEC^2
BH CV ECHO MEAS - MV DEC TIME: 0.25 SEC
BH CV ECHO MEAS - MV E MAX VEL: 60.7 CM/SEC
BH CV ECHO MEAS - MV E/A: 0.87
BH CV ECHO MEAS - MV P1/2T MAX VEL: 62.4 CM/SEC
BH CV ECHO MEAS - MV P1/2T: 71.4 MSEC
BH CV ECHO MEAS - MVA P1/2T LCG: 3.5 CM^2
BH CV ECHO MEAS - MVA(P1/2T): 3.1 CM^2
BH CV ECHO MEAS - PULM A REVS DUR: 0.1 SEC
BH CV ECHO MEAS - PULM A REVS VEL: 31.9 CM/SEC
BH CV ECHO MEAS - PULM DIAS VEL: 54.3 CM/SEC
BH CV ECHO MEAS - PULM S/D: 1
BH CV ECHO MEAS - PULM SYS VEL: 56.7 CM/SEC
BH CV ECHO MEAS - SI(CUBED): 41.8 ML/M^2
BH CV ECHO MEAS - SI(MOD-SP2): 31.9 ML/M^2
BH CV ECHO MEAS - SI(MOD-SP4): 35.4 ML/M^2
BH CV ECHO MEAS - SI(TEICH): 37.4 ML/M^2
BH CV ECHO MEAS - SV(CUBED): 82.6 ML
BH CV ECHO MEAS - SV(MOD-SP2): 63 ML
BH CV ECHO MEAS - SV(MOD-SP4): 70 ML
BH CV ECHO MEAS - SV(TEICH): 74 ML
BH CV ECHO MEAS - TAPSE (>1.6): 1.8 CM2
BH CV ECHO MEAS - TR MAX VEL: 223.2 CM/SEC
BH CV STRESS BP STAGE 1: NORMAL
BH CV STRESS BP STAGE 2: NORMAL
BH CV STRESS DURATION MIN STAGE 1: 3
BH CV STRESS DURATION MIN STAGE 2: 3
BH CV STRESS DURATION MIN STAGE 3: 0
BH CV STRESS DURATION SEC STAGE 1: 0
BH CV STRESS DURATION SEC STAGE 2: 0
BH CV STRESS DURATION SEC STAGE 3: 30
BH CV STRESS ECHO POST STRESS EJECTION FRACTION EF: 79 %
BH CV STRESS GRADE STAGE 1: 10
BH CV STRESS GRADE STAGE 2: 12
BH CV STRESS GRADE STAGE 3: 14
BH CV STRESS HR STAGE 1: 118
BH CV STRESS HR STAGE 2: 135
BH CV STRESS HR STAGE 3: 140
BH CV STRESS METS STAGE 1: 5
BH CV STRESS METS STAGE 2: 7.5
BH CV STRESS METS STAGE 3: 10
BH CV STRESS PROTOCOL 1: NORMAL
BH CV STRESS SPEED STAGE 1: 1.7
BH CV STRESS SPEED STAGE 2: 2.5
BH CV STRESS SPEED STAGE 3: 3.4
BH CV STRESS STAGE 1: 1
BH CV STRESS STAGE 2: 2
BH CV STRESS STAGE 3: 3
BH CV XLRA - RV BASE: 2.8 CM
BH CV XLRA - TDI S': 10 CM/SEC
E/E' RATIO: 9
LEFT ATRIUM VOLUME INDEX: 25 ML/M2
MAXIMAL PREDICTED HEART RATE: 156 BPM
PERCENT MAX PREDICTED HR: 89.74 %
STRESS BASELINE BP: NORMAL MMHG
STRESS BASELINE HR: 84 BPM
STRESS O2 SAT REST: 95 %
STRESS PERCENT HR: 106 %
STRESS POST ESTIMATED WORKLOAD: 7 METS
STRESS POST EXERCISE DUR MIN: 6 MIN
STRESS POST EXERCISE DUR SEC: 30 SEC
STRESS POST PEAK BP: NORMAL MMHG
STRESS POST PEAK HR: 140 BPM
STRESS TARGET HR: 133 BPM

## 2017-09-07 ENCOUNTER — APPOINTMENT (OUTPATIENT)
Dept: CARDIOLOGY | Facility: HOSPITAL | Age: 67
End: 2017-09-07
Attending: NURSE PRACTITIONER

## 2017-09-07 ENCOUNTER — APPOINTMENT (OUTPATIENT)
Dept: NUCLEAR MEDICINE | Facility: HOSPITAL | Age: 67
End: 2017-09-07
Attending: NURSE PRACTITIONER

## 2017-11-28 ENCOUNTER — OFFICE VISIT (OUTPATIENT)
Dept: ONCOLOGY | Facility: CLINIC | Age: 67
End: 2017-11-28

## 2017-11-28 ENCOUNTER — LAB (OUTPATIENT)
Dept: LAB | Facility: HOSPITAL | Age: 67
End: 2017-11-28

## 2017-11-28 VITALS
OXYGEN SATURATION: 95 % | WEIGHT: 188.6 LBS | DIASTOLIC BLOOD PRESSURE: 84 MMHG | TEMPERATURE: 98 F | RESPIRATION RATE: 16 BRPM | SYSTOLIC BLOOD PRESSURE: 167 MMHG | HEART RATE: 76 BPM | BODY MASS INDEX: 27.93 KG/M2 | HEIGHT: 69 IN

## 2017-11-28 DIAGNOSIS — D72.819 CHRONIC LEUKOPENIA: ICD-10-CM

## 2017-11-28 DIAGNOSIS — C50.311 MALIGNANT NEOPLASM OF LOWER-INNER QUADRANT OF RIGHT FEMALE BREAST (HCC): ICD-10-CM

## 2017-11-28 DIAGNOSIS — C50.311 MALIGNANT NEOPLASM OF LOWER-INNER QUADRANT OF RIGHT BREAST OF FEMALE, ESTROGEN RECEPTOR POSITIVE (HCC): Primary | ICD-10-CM

## 2017-11-28 DIAGNOSIS — Z17.0 MALIGNANT NEOPLASM OF LOWER-INNER QUADRANT OF RIGHT BREAST OF FEMALE, ESTROGEN RECEPTOR POSITIVE (HCC): Primary | ICD-10-CM

## 2017-11-28 DIAGNOSIS — M85.80 OSTEOPENIA: ICD-10-CM

## 2017-11-28 LAB
ALBUMIN SERPL-MCNC: 4.1 G/DL (ref 3.5–5.2)
ALBUMIN/GLOB SERPL: 1.5 G/DL
ALP SERPL-CCNC: 82 U/L (ref 40–129)
ALT SERPL W P-5'-P-CCNC: 31 U/L (ref 5–33)
ANION GAP SERPL CALCULATED.3IONS-SCNC: 10.2 MMOL/L
AST SERPL-CCNC: 29 U/L (ref 5–32)
BASOPHILS # BLD AUTO: 0.01 10*3/MM3 (ref 0–0.2)
BASOPHILS NFR BLD AUTO: 0.3 % (ref 0–2)
BILIRUB SERPL-MCNC: 0.4 MG/DL (ref 0.2–1.2)
BUN BLD-MCNC: 16 MG/DL (ref 8–23)
BUN/CREAT SERPL: 16.5 (ref 7–25)
CALCIUM SPEC-SCNC: 9.2 MG/DL (ref 8.8–10.5)
CHLORIDE SERPL-SCNC: 100 MMOL/L (ref 98–107)
CO2 SERPL-SCNC: 27.8 MMOL/L (ref 22–29)
CREAT BLD-MCNC: 0.97 MG/DL (ref 0.57–1)
DEPRECATED RDW RBC AUTO: 42.5 FL (ref 37–54)
EOSINOPHIL # BLD AUTO: 0.16 10*3/MM3 (ref 0.1–0.3)
EOSINOPHIL NFR BLD AUTO: 4.1 % (ref 0–4)
ERYTHROCYTE [DISTWIDTH] IN BLOOD BY AUTOMATED COUNT: 12.6 % (ref 11.5–14.5)
ERYTHROCYTE [SEDIMENTATION RATE] IN BLOOD: 10 MM/HR (ref 0–20)
GFR SERPL CREATININE-BSD FRML MDRD: 58 ML/MIN/1.73
GLOBULIN UR ELPH-MCNC: 2.7 GM/DL
GLUCOSE BLD-MCNC: 101 MG/DL (ref 65–99)
HCT VFR BLD AUTO: 36.8 % (ref 37–47)
HGB BLD-MCNC: 12.5 G/DL (ref 12–16)
IMM GRANULOCYTES # BLD: 0.01 10*3/MM3 (ref 0–0.03)
IMM GRANULOCYTES NFR BLD: 0.3 % (ref 0–0.5)
LDH SERPL-CCNC: 244 U/L (ref 135–214)
LYMPHOCYTES # BLD AUTO: 0.9 10*3/MM3 (ref 0.6–4.8)
LYMPHOCYTES NFR BLD AUTO: 23.3 % (ref 20–45)
MCH RBC QN AUTO: 31.5 PG (ref 27–31)
MCHC RBC AUTO-ENTMCNC: 34 G/DL (ref 31–37)
MCV RBC AUTO: 92.7 FL (ref 81–99)
MONOCYTES # BLD AUTO: 0.3 10*3/MM3 (ref 0–1)
MONOCYTES NFR BLD AUTO: 7.8 % (ref 3–8)
NEUTROPHILS # BLD AUTO: 2.48 10*3/MM3 (ref 1.5–8.3)
NEUTROPHILS NFR BLD AUTO: 64.2 % (ref 45–70)
NRBC BLD MANUAL-RTO: 0 /100 WBC (ref 0–0)
PLATELET # BLD AUTO: 221 10*3/MM3 (ref 140–500)
PMV BLD AUTO: 9.3 FL (ref 7.4–10.4)
POTASSIUM BLD-SCNC: 4.7 MMOL/L (ref 3.5–5.2)
PROT SERPL-MCNC: 6.8 G/DL (ref 6–8.5)
RBC # BLD AUTO: 3.97 10*6/MM3 (ref 4.2–5.4)
SODIUM BLD-SCNC: 138 MMOL/L (ref 136–145)
VIT B12 BLD-MCNC: 362 PG/ML (ref 232–1245)
WBC NRBC COR # BLD: 3.86 10*3/MM3 (ref 4.8–10.8)

## 2017-11-28 PROCEDURE — 86038 ANTINUCLEAR ANTIBODIES: CPT | Performed by: INTERNAL MEDICINE

## 2017-11-28 PROCEDURE — 83615 LACTATE (LD) (LDH) ENZYME: CPT | Performed by: INTERNAL MEDICINE

## 2017-11-28 PROCEDURE — 85652 RBC SED RATE AUTOMATED: CPT | Performed by: INTERNAL MEDICINE

## 2017-11-28 PROCEDURE — 86334 IMMUNOFIX E-PHORESIS SERUM: CPT | Performed by: INTERNAL MEDICINE

## 2017-11-28 PROCEDURE — 99214 OFFICE O/P EST MOD 30 MIN: CPT | Performed by: INTERNAL MEDICINE

## 2017-11-28 PROCEDURE — 80053 COMPREHEN METABOLIC PANEL: CPT | Performed by: INTERNAL MEDICINE

## 2017-11-28 PROCEDURE — 36415 COLL VENOUS BLD VENIPUNCTURE: CPT | Performed by: INTERNAL MEDICINE

## 2017-11-28 PROCEDURE — 85025 COMPLETE CBC W/AUTO DIFF WBC: CPT | Performed by: INTERNAL MEDICINE

## 2017-11-28 PROCEDURE — 84165 PROTEIN E-PHORESIS SERUM: CPT | Performed by: INTERNAL MEDICINE

## 2017-11-28 PROCEDURE — 88189 FLOWCYTOMETRY/READ 16 & >: CPT | Performed by: INTERNAL MEDICINE

## 2017-11-28 PROCEDURE — 88184 FLOWCYTOMETRY/ TC 1 MARKER: CPT | Performed by: INTERNAL MEDICINE

## 2017-11-28 PROCEDURE — 82607 VITAMIN B-12: CPT | Performed by: INTERNAL MEDICINE

## 2017-11-28 PROCEDURE — 88185 FLOWCYTOMETRY/TC ADD-ON: CPT | Performed by: INTERNAL MEDICINE

## 2017-11-28 NOTE — PROGRESS NOTES
Subjective:     Reason for follow up:   1. Stage IIA (T2N0M0) hormone receptor positive, HER2/win negative carcinoma of the right breast, high risk oncotype (38)   * status post lumpectomy and sentinel lymph node sampling, 07/31/2013.    * status post four cycles of Taxotere/Cytoxan 9/2013 through 12/2013.   * status post radiation therapy    * Arimidex 1 mg p.o. daily initiated 01/06/2014.   2. Osteopenia/osteoporosis. Patient was started on weekly Fosamax oral on 05/22/2014. Zometa offered but copay too high.  3. Chronic leukopenia     History of Present Ilness: Barbara Sweet presents for follow-up of her breast cancer.  I reviewed her bone density scan from December 2016 that revealed osteopenia.  She remains on Arimidex and tolerates it well. She is stressed about the divorce still and is on multiple medications. Her fibromyalgia has acted up due to stress. She does regular breast exams and hasn't noticed any new abnormalities. She's worried about her blood counts. She's afebrile without recurrent infections.     Past Medical   Past Medical History:   Diagnosis Date   • Asthma    • Breast cancer 2013    Stage IIA, right   • DDD (degenerative disc disease), lumbar    • Diffuse cystic mastopathy 5/3/2016   • Drug therapy    • Fibroids    • Fibromyalgia    • GERD (gastroesophageal reflux disease)    • H/O infectious mononucleosis    • H/O Miscarriage     x3   • H/O Polymyalgia    • Headache    • Heart disease    • Hot flashes    • Hyperlipidemia    • Hypertension    • Memory loss    • Radiation      Patient Active Problem List   Diagnosis   • Malignant neoplasm of lower-inner quadrant of right female breast   • Radial scar of breast   • Diffuse cystic mastopathy   • FH: breast cancer   • Osteopenia   • Chronic leukopenia     Social History   Social History     Social History   • Marital status:      Spouse name: Get   • Number of children: 0   • Years of education: College     Occupational History   •  "Housewife Unemployed     Previous      Social History Main Topics   • Smoking status: Never Smoker   • Smokeless tobacco: Never Used   • Alcohol use No   • Drug use: No   • Sexual activity: Defer     Other Topics Concern   • Not on file     Social History Narrative      Family History  Family History   Problem Relation Age of Onset   • Stroke Other    • Hypertension Other    • Heart failure Other    • Heart disease Mother    • Hypertension Mother    • Diabetes Mother    • Heart disease Father    • Hypertension Father    • Diabetes Father    • Heart disease Sister    • Hypertension Sister    • Diabetes Sister    • Breast cancer Sister      In her late 70s   • Cancer Brother      Several; had smoking-related malignancies, throat, lung   • Heart disease Brother    • Hypertension Brother    • Diabetes Brother      Allergies  Allergies   Allergen Reactions   • Sulphadimidine [Sulfamethazine] Swelling       Medications: The current medication list was reviewed in the EMR.    Review of Systems  Review of Systems   Constitutional: Negative for activity change, appetite change, chills, diaphoresis, fatigue, fever and unexpected weight change.   Respiratory: Negative for cough, chest tightness and shortness of breath.    Cardiovascular: Negative for chest pain, palpitations and leg swelling.   Gastrointestinal: Negative for abdominal pain, blood in stool, constipation, diarrhea, nausea and vomiting.   Musculoskeletal: Negative for arthralgias, joint swelling and myalgias.   Hematological: Negative for adenopathy. Does not bruise/bleed easily.          Objective:     Vitals:    11/28/17 1055   BP: 167/84   Pulse: 76   Resp: 16   Temp: 98 °F (36.7 °C)   TempSrc: Oral   SpO2: 95%   Weight: 188 lb 9.6 oz (85.5 kg)   Height: 68.5\" (174 cm)   PainSc: 6  Comment: soreness in shoulders, arms and back     Current Status 11/28/2017   ECOG score 0   GENERAL: female comfortable, no acute distress  SKIN:  Without rashes, " purpura or petechiae.   HEAD:  Normocephalic.  EYES:  EOMs intact.  Conjunctivae normal.  EARS:  Hearing intact.  LYMPHATICS:  No cervical, supraclavicular, axillary adenopathy.  CHEST:  Lungs clear to percussion and auscultation. Good airflow. No palpable masses, skin changes, nipple discharge in right breast.  CARDIAC:  Regular rate and rhythm without murmurs, rubs or gallops. Normal S1,S2.  ABDOMEN:  Soft, nontender, normal bowel sounds  EXTREMITIES:  No clubbing, cyanosis or edema.  PSYCHIATRIC:  Normal affect and mood.         Labs and Imaging  Lab Results   Component Value Date    WBC 3.90 (L) 08/25/2017    HGB 12.8 08/25/2017    HCT 36.6 08/25/2017    MCV 92.2 08/25/2017     08/25/2017       Breast imaging: None  DEXA 12/16: osteopenia    Assessment/Plan     Assessment:   1. Stage IIA (T2N0M0) hormone receptor positive, HER2/win negative carcinoma of the right breast, high risk oncotype (38)   * status post lumpectomy and sentinel lymph node sampling, 07/31/2013.    * status post four cycles of Taxotere/Cytoxan 9/2013 thorugh 12/2013   * status post radiation therapy    * Arimidex 1 mg p.o. daily initiated 01/06/2014. Tolerates well.    * Remains no evidence of disease.  2. Osteopenia. Patient was started on weekly Fosamax oral on 05/22/2014. Her tolerance of Fosamax is moderate. Zometa offered but copay too high.  3. Mild leukopenia. Stable, but worries patient. Will check lab workup.       Plan:     1. Continue anastrazole.  2. Annual mammogram in March   3. DEXA scan 12/18.   4. Patient was instructed on the importance of physical activity, healthy diet, and self breast exams.  Patient will continue calcium and vitamin D supplementation.    5. Labs for leukopenia  6. RTc in 1 months.

## 2017-11-29 LAB
ALBUMIN SERPL-MCNC: 4 G/DL (ref 2.9–4.4)
ALBUMIN/GLOB SERPL: 1.3 {RATIO} (ref 0.7–1.7)
ALPHA1 GLOB FLD ELPH-MCNC: 0.3 G/DL (ref 0–0.4)
ALPHA2 GLOB SERPL ELPH-MCNC: 0.7 G/DL (ref 0.4–1)
ANA SER QL: NEGATIVE
B-GLOBULIN SERPL ELPH-MCNC: 1.1 G/DL (ref 0.7–1.3)
GAMMA GLOB SERPL ELPH-MCNC: 1.1 G/DL (ref 0.4–1.8)
GLOBULIN SER CALC-MCNC: 3.1 G/DL (ref 2.2–3.9)
IGA SERPL-MCNC: 81 MG/DL (ref 87–352)
IGG SERPL-MCNC: 861 MG/DL (ref 700–1600)
IGM SERPL-MCNC: 214 MG/DL (ref 26–217)
Lab: NORMAL
M-SPIKE: NORMAL G/DL
PROT PATTERN SERPL IFE-IMP: ABNORMAL
PROT SERPL-MCNC: 7.1 G/DL (ref 6–8.5)

## 2017-12-06 LAB — REF LAB TEST METHOD: NORMAL

## 2017-12-26 ENCOUNTER — OFFICE VISIT (OUTPATIENT)
Dept: ONCOLOGY | Facility: CLINIC | Age: 67
End: 2017-12-26

## 2017-12-26 ENCOUNTER — LAB (OUTPATIENT)
Dept: LAB | Facility: HOSPITAL | Age: 67
End: 2017-12-26

## 2017-12-26 VITALS
SYSTOLIC BLOOD PRESSURE: 184 MMHG | BODY MASS INDEX: 28.23 KG/M2 | DIASTOLIC BLOOD PRESSURE: 93 MMHG | WEIGHT: 190.6 LBS | OXYGEN SATURATION: 98 % | HEART RATE: 70 BPM | RESPIRATION RATE: 16 BRPM | HEIGHT: 69 IN | TEMPERATURE: 98.1 F

## 2017-12-26 DIAGNOSIS — C50.311 MALIGNANT NEOPLASM OF LOWER-INNER QUADRANT OF RIGHT BREAST OF FEMALE, ESTROGEN RECEPTOR POSITIVE (HCC): Primary | ICD-10-CM

## 2017-12-26 DIAGNOSIS — Z12.31 ENCOUNTER FOR SCREENING MAMMOGRAM FOR BREAST CANCER: ICD-10-CM

## 2017-12-26 DIAGNOSIS — C50.311 MALIGNANT NEOPLASM OF LOWER-INNER QUADRANT OF RIGHT FEMALE BREAST, UNSPECIFIED ESTROGEN RECEPTOR STATUS (HCC): Primary | ICD-10-CM

## 2017-12-26 DIAGNOSIS — D72.819 CHRONIC LEUKOPENIA: ICD-10-CM

## 2017-12-26 DIAGNOSIS — Z17.0 MALIGNANT NEOPLASM OF LOWER-INNER QUADRANT OF RIGHT BREAST OF FEMALE, ESTROGEN RECEPTOR POSITIVE (HCC): Primary | ICD-10-CM

## 2017-12-26 LAB
BASOPHILS # BLD AUTO: 0.02 10*3/MM3 (ref 0–0.2)
BASOPHILS NFR BLD AUTO: 0.5 % (ref 0–2)
DEPRECATED RDW RBC AUTO: 39.3 FL (ref 37–54)
EOSINOPHIL # BLD AUTO: 0.16 10*3/MM3 (ref 0.1–0.3)
EOSINOPHIL NFR BLD AUTO: 3.7 % (ref 0–4)
ERYTHROCYTE [DISTWIDTH] IN BLOOD BY AUTOMATED COUNT: 12.2 % (ref 11.5–14.5)
HCT VFR BLD AUTO: 36.2 % (ref 37–47)
HGB BLD-MCNC: 13.2 G/DL (ref 12–16)
IMM GRANULOCYTES # BLD: 0.02 10*3/MM3 (ref 0–0.03)
IMM GRANULOCYTES NFR BLD: 0.5 % (ref 0–0.5)
LYMPHOCYTES # BLD AUTO: 1.06 10*3/MM3 (ref 0.6–4.8)
LYMPHOCYTES NFR BLD AUTO: 24.8 % (ref 20–45)
MCH RBC QN AUTO: 32.2 PG (ref 27–31)
MCHC RBC AUTO-ENTMCNC: 36.5 G/DL (ref 31–37)
MCV RBC AUTO: 88.3 FL (ref 81–99)
MONOCYTES # BLD AUTO: 0.29 10*3/MM3 (ref 0–1)
MONOCYTES NFR BLD AUTO: 6.8 % (ref 3–8)
NEUTROPHILS # BLD AUTO: 2.72 10*3/MM3 (ref 1.5–8.3)
NEUTROPHILS NFR BLD AUTO: 63.7 % (ref 45–70)
NRBC BLD MANUAL-RTO: 0 /100 WBC (ref 0–0)
PLATELET # BLD AUTO: 216 10*3/MM3 (ref 140–500)
PMV BLD AUTO: 9.8 FL (ref 7.4–10.4)
RBC # BLD AUTO: 4.1 10*6/MM3 (ref 4.2–5.4)
WBC NRBC COR # BLD: 4.27 10*3/MM3 (ref 4.8–10.8)

## 2017-12-26 PROCEDURE — 85025 COMPLETE CBC W/AUTO DIFF WBC: CPT

## 2017-12-26 PROCEDURE — 99214 OFFICE O/P EST MOD 30 MIN: CPT | Performed by: INTERNAL MEDICINE

## 2017-12-26 PROCEDURE — 36415 COLL VENOUS BLD VENIPUNCTURE: CPT | Performed by: INTERNAL MEDICINE

## 2017-12-26 NOTE — PROGRESS NOTES
Subjective:     Reason for follow up:   1. Stage IIA (T2N0M0) hormone receptor positive, HER2/win negative carcinoma of the right breast, high risk oncotype (38)   * status post lumpectomy and sentinel lymph node sampling, 07/31/2013.    * status post four cycles of Taxotere/Cytoxan 9/2013 through 12/2013.   * status post radiation therapy    * Arimidex 1 mg p.o. daily initiated 01/06/2014.   2. Osteopenia/osteoporosis. Patient was started on weekly Fosamax oral on 05/22/2014. Zometa offered but copay too high.  3. Chronic leukopenia     History of Present Ilness: Barbara Sweet presents for follow-up of her breast cancer. She returns today to review her lab evaluation for leukopenia. She had an SPEP, B12, LDH, LACI and flow cytometry performed all of which were unremarkable. She remains extremely stressed.  She is without fevers, chills, night sweats, recurrent infections.  She does have chronic arthralgias related to her fibromyalgia.    Past Medical   Past Medical History:   Diagnosis Date   • Asthma    • Breast cancer 2013    Stage IIA, right   • DDD (degenerative disc disease), lumbar    • Diffuse cystic mastopathy 5/3/2016   • Drug therapy    • Fibroids    • Fibromyalgia    • GERD (gastroesophageal reflux disease)    • H/O infectious mononucleosis    • H/O Miscarriage     x3   • H/O Polymyalgia    • Headache    • Heart disease    • Hot flashes    • Hyperlipidemia    • Hypertension    • Memory loss    • Radiation      Patient Active Problem List   Diagnosis   • Malignant neoplasm of lower-inner quadrant of right female breast   • Radial scar of breast   • Diffuse cystic mastopathy   • FH: breast cancer   • Osteopenia   • Chronic leukopenia     Social History   Social History     Social History   • Marital status:      Spouse name: Get   • Number of children: 0   • Years of education: College     Occupational History   • Housewife Unemployed     Previous      Social History Main Topics   •  "Smoking status: Never Smoker   • Smokeless tobacco: Never Used   • Alcohol use No   • Drug use: No   • Sexual activity: Defer     Other Topics Concern   • Not on file     Social History Narrative      Family History  Family History   Problem Relation Age of Onset   • Stroke Other    • Hypertension Other    • Heart failure Other    • Heart disease Mother    • Hypertension Mother    • Diabetes Mother    • Heart disease Father    • Hypertension Father    • Diabetes Father    • Heart disease Sister    • Hypertension Sister    • Diabetes Sister    • Breast cancer Sister      In her late 70s   • Cancer Brother      Several; had smoking-related malignancies, throat, lung   • Heart disease Brother    • Hypertension Brother    • Diabetes Brother      Allergies  Allergies   Allergen Reactions   • Sulphadimidine [Sulfamethazine] Swelling       Medications: The current medication list was reviewed in the EMR.    Review of Systems  Review of Systems   Constitutional: Positive for fatigue. Negative for activity change, appetite change, chills, diaphoresis, fever and unexpected weight change.   Respiratory: Negative for cough, chest tightness and shortness of breath.    Cardiovascular: Negative for chest pain, palpitations and leg swelling.   Gastrointestinal: Negative for abdominal pain, blood in stool, constipation, diarrhea, nausea and vomiting.   Musculoskeletal: Positive for arthralgias and back pain. Negative for joint swelling and myalgias.   Hematological: Negative for adenopathy. Does not bruise/bleed easily.          Objective:     Vitals:    12/26/17 1443   BP: (!) 184/93   Pulse: 70   Resp: 16   Temp: 98.1 °F (36.7 °C)   TempSrc: Oral   SpO2: 98%   Weight: 86.5 kg (190 lb 9.6 oz)   Height: 174 cm (68.5\")   PainSc: 5  Comment: 5-6 all over pain     Current Status 12/26/2017   ECOG score 0   GENERAL:  Well-developed, well-nourished female in no acute distress.   SKIN:  Warm, dry without rashes, purpura or " petechiae.  HENT: Hearing: normal, Lips normal. Dentition: good  LYMPHATICS:  No cervical, supraclavicular, axillary adenopathy.  RESPIRATORY:  Lungs clear to percussion and auscultation. Good airflow. Normal respiratory effort  CARDIAC:  Regular rate and rhythm without murmurs, rubs or gallops. Normal S1,S2. Edema -  No  GI: Soft, nontender, non-distended, no hernia present  PSYCHIATRIC:  Normal affect and mood.  Oriented to person/place/time.  MSK: Gait: Normal; No clubbing, cyanosis. No tenderness or swelling in left wrist, right wrist       Labs and Imaging  Results for orders placed or performed in visit on 11/28/17   Vitamin B12   Result Value Ref Range    Vitamin B-12 362 232 - 1245 pg/mL   Lactate Dehydrogenase   Result Value Ref Range     (H) 135 - 214 U/L   Protein Electrophoresis, Total   Result Value Ref Range    Total Protein 7.1 6.0 - 8.5 g/dL    Albumin 4.0 2.9 - 4.4 g/dL    Alpha-1-Globulin 0.3 0.0 - 0.4 g/dL    Alpha-2-Globulin 0.7 0.4 - 1.0 g/dL    Beta Globulin 1.1 0.7 - 1.3 g/dL    Gamma Globulin 1.1 0.4 - 1.8 g/dL    M-[place] Not Observed Not Observed g/dL    Globulin 3.1 2.2 - 3.9 g/dL    A/G Ratio 1.3 0.7 - 1.7    Please note Comment    Immunofixation, Serum   Result Value Ref Range    Immunofixation Result, Serum Comment     IgG 861 700 - 1600 mg/dL    IgA 81 (L) 87 - 352 mg/dL    IgM 214 26 - 217 mg/dL   Sedimentation Rate   Result Value Ref Range    Sed Rate 10 0 - 20 mm/hr   Antinuclear Antibodies Direct   Result Value Ref Range    LACI Direct Negative Negative   Flow Cytometry, Blood   Result Value Ref Range    Reference Lab Report see attached report        Breast imaging: None  DEXA 12/16: osteopenia    Assessment/Plan     Assessment:   1. Stage IIA (T2N0M0) hormone receptor positive, HER2/win negative carcinoma of the right breast, high risk oncotype (38)   * status post lumpectomy and sentinel lymph node sampling, 07/31/2013.    * status post four cycles of Taxotere/Cytoxan  9/2013 gulilermina 12/2013   * status post radiation therapy    * Arimidex 1 mg p.o. daily initiated 01/06/2014. Tolerates well.    * Remains no evidence of disease.  2. Osteopenia. Patient was started on weekly Fosamax oral on 05/22/2014. Her tolerance of Fosamax is moderate. Zometa offered but copay too high.  3. Mild leukopenia. Stable.   * SPEP, B12. LDH, LACI, Flow cytometry unremarkable.       Plan:     1. Continue anastrazole.  2. Annual mammogram in March   3. DEXA scan 12/18.   4. Patient was instructed on the importance of physical activity, healthy diet, and self breast exams.  Patient will continue calcium and vitamin D supplementation.    5. RTc in 6 months.

## 2018-01-05 ENCOUNTER — TELEPHONE (OUTPATIENT)
Dept: CARDIOLOGY | Facility: HOSPITAL | Age: 68
End: 2018-01-05

## 2023-08-25 ENCOUNTER — APPOINTMENT (OUTPATIENT)
Dept: URBAN - METROPOLITAN AREA CLINIC 236 | Age: 73
Setting detail: DERMATOLOGY
End: 2023-08-29

## 2023-08-25 DIAGNOSIS — L57.0 ACTINIC KERATOSIS: ICD-10-CM

## 2023-08-25 DIAGNOSIS — L30.9 DERMATITIS, UNSPECIFIED: ICD-10-CM

## 2023-08-25 PROCEDURE — 17000 DESTRUCT PREMALG LESION: CPT

## 2023-08-25 PROCEDURE — 99202 OFFICE O/P NEW SF 15 MIN: CPT | Mod: 25

## 2023-08-25 PROCEDURE — OTHER LIQUID NITROGEN: OTHER

## 2023-08-25 PROCEDURE — 17003 DESTRUCT PREMALG LES 2-14: CPT

## 2023-08-25 PROCEDURE — OTHER COUNSELING: OTHER

## 2023-08-25 ASSESSMENT — LOCATION SIMPLE DESCRIPTION DERM
LOCATION SIMPLE: LEFT THIGH
LOCATION SIMPLE: RIGHT THIGH
LOCATION SIMPLE: LEFT UPPER ARM
LOCATION SIMPLE: LEFT CHEEK
LOCATION SIMPLE: LEFT BREAST
LOCATION SIMPLE: RIGHT CHEEK
LOCATION SIMPLE: UPPER BACK
LOCATION SIMPLE: NOSE
LOCATION SIMPLE: LEFT WRIST
LOCATION SIMPLE: RIGHT UPPER ARM

## 2023-08-25 ASSESSMENT — LOCATION ZONE DERM
LOCATION ZONE: LEG
LOCATION ZONE: TRUNK
LOCATION ZONE: FACE
LOCATION ZONE: ARM
LOCATION ZONE: NOSE

## 2023-08-25 ASSESSMENT — LOCATION DETAILED DESCRIPTION DERM
LOCATION DETAILED: LEFT INFERIOR CENTRAL MALAR CHEEK
LOCATION DETAILED: LEFT SUPERIOR LATERAL MALAR CHEEK
LOCATION DETAILED: LEFT LATERAL BREAST 4-5:00 REGION
LOCATION DETAILED: NASAL DORSUM
LOCATION DETAILED: SUPERIOR THORACIC SPINE
LOCATION DETAILED: RIGHT ANTERIOR PROXIMAL UPPER ARM
LOCATION DETAILED: LEFT ANTERIOR PROXIMAL UPPER ARM
LOCATION DETAILED: LEFT ANTERIOR LATERAL PROXIMAL THIGH
LOCATION DETAILED: RIGHT ANTERIOR LATERAL PROXIMAL THIGH
LOCATION DETAILED: RIGHT LATERAL MALAR CHEEK
LOCATION DETAILED: LEFT LATERAL MALAR CHEEK
LOCATION DETAILED: LEFT VENTRAL WRIST
LOCATION DETAILED: RIGHT SUPERIOR LATERAL BUCCAL CHEEK

## 2023-08-25 NOTE — HPI: RASH
What Type Of Note Output Would You Prefer (Optional)?: Standard Output
How Severe Is Your Rash?: moderate
Is This A New Presentation, Or A Follow-Up?: Rash
Additional History: pt admits the rash is itchy, but \"not much\", and has been spreading.  pt describes the rash as a deep red, slightly raised rash, with some patches more raised that others.  also admits that a few areas have scale, but not all.  pt had this rash previously biopsied by another derm with the result of \"granulomatous dermatitis\".  pt has not tried anything to tx other than nystatin cream and vinegar.  + hx of arthritis and breast CA and pt takes a large number of medications.

## 2023-08-25 NOTE — PROCEDURE: LIQUID NITROGEN
Show Applicator Variable?: Yes
Consent: The patient's consent was obtained including but not limited to risks of crusting, scabbing, blistering, scarring, darker or lighter pigmentary change, recurrence, incomplete removal and infection.
Detail Level: Detailed
Number Of Freeze-Thaw Cycles: 2 freeze-thaw cycles
Render Post-Care Instructions In Note?: no
Duration Of Freeze Thaw-Cycle (Seconds): 3
Post-Care Instructions: I reviewed with the patient in detail post-care instructions. Patient is to wear sunprotection, and avoid picking at any of the treated lesions. Pt may apply Vaseline to crusted or scabbing areas.
Application Tool (Optional): Liquid Nitrogen Sprayer

## 2023-08-25 NOTE — PROCEDURE: COUNSELING
Detail Level: Detailed
Detail Level: Zone
Patient Specific Counseling (Will Not Stick From Patient To Patient): -\\n2/2 little-no itch, no meds at this time until bx can be repeated\\nplan to repeat punch bx (plan 2 punches)\\nconsulted with Dr. Jose regarding this pt who agrees with, and helped to establish, current plan

## 2023-09-01 ENCOUNTER — APPOINTMENT (OUTPATIENT)
Dept: URBAN - METROPOLITAN AREA CLINIC 236 | Age: 73
Setting detail: DERMATOLOGY
End: 2023-09-01

## 2023-09-01 DIAGNOSIS — L30.9 DERMATITIS, UNSPECIFIED: ICD-10-CM

## 2023-09-01 PROCEDURE — OTHER COUNSELING: OTHER

## 2023-09-01 PROCEDURE — 11105 PUNCH BX SKIN EA SEP/ADDL: CPT | Mod: 79

## 2023-09-01 PROCEDURE — 11104 PUNCH BX SKIN SINGLE LESION: CPT | Mod: 79

## 2023-09-01 PROCEDURE — OTHER BIOPSY BY PUNCH METHOD: OTHER

## 2023-09-01 ASSESSMENT — LOCATION SIMPLE DESCRIPTION DERM
LOCATION SIMPLE: UPPER BACK
LOCATION SIMPLE: LEFT UPPER BACK
LOCATION SIMPLE: LEFT THIGH
LOCATION SIMPLE: RIGHT UPPER ARM
LOCATION SIMPLE: RIGHT THIGH
LOCATION SIMPLE: LEFT WRIST
LOCATION SIMPLE: RIGHT UPPER BACK
LOCATION SIMPLE: LEFT BREAST
LOCATION SIMPLE: LEFT UPPER ARM

## 2023-09-01 ASSESSMENT — LOCATION DETAILED DESCRIPTION DERM
LOCATION DETAILED: LEFT MID-UPPER BACK
LOCATION DETAILED: RIGHT MID-UPPER BACK
LOCATION DETAILED: LEFT ANTERIOR PROXIMAL UPPER ARM
LOCATION DETAILED: LEFT ANTERIOR LATERAL PROXIMAL THIGH
LOCATION DETAILED: RIGHT ANTERIOR LATERAL PROXIMAL THIGH
LOCATION DETAILED: LEFT VENTRAL WRIST
LOCATION DETAILED: RIGHT ANTERIOR PROXIMAL UPPER ARM
LOCATION DETAILED: SUPERIOR THORACIC SPINE
LOCATION DETAILED: LEFT LATERAL BREAST 4-5:00 REGION

## 2023-09-01 ASSESSMENT — LOCATION ZONE DERM
LOCATION ZONE: LEG
LOCATION ZONE: TRUNK
LOCATION ZONE: ARM

## 2023-09-01 NOTE — HPI: RASH
What Type Of Note Output Would You Prefer (Optional)?: Bullet Format
How Severe Is Your Rash?: mild
Is This A New Presentation, Or A Follow-Up?: Follow Up Rash
Additional History: Patient presents for a biopsy today.\\n\\nPatient switched from rosuvastatin to pravastatin. She has noticed the rash has improved a little since stopping the rosuvastatin.

## 2023-09-18 ENCOUNTER — APPOINTMENT (OUTPATIENT)
Dept: URBAN - METROPOLITAN AREA CLINIC 236 | Age: 73
Setting detail: DERMATOLOGY
End: 2023-09-18

## 2023-09-18 DIAGNOSIS — Z48.02 ENCOUNTER FOR REMOVAL OF SUTURES: ICD-10-CM

## 2023-09-18 DIAGNOSIS — L57.0 ACTINIC KERATOSIS: ICD-10-CM

## 2023-09-18 DIAGNOSIS — L82.0 INFLAMED SEBORRHEIC KERATOSIS: ICD-10-CM

## 2023-09-18 DIAGNOSIS — L92.0 GRANULOMA ANNULARE: ICD-10-CM

## 2023-09-18 PROCEDURE — 17110 DESTRUCT B9 LESION 1-14: CPT

## 2023-09-18 PROCEDURE — OTHER MEDICATION COUNSELING: OTHER

## 2023-09-18 PROCEDURE — OTHER LIQUID NITROGEN: OTHER

## 2023-09-18 PROCEDURE — 17003 DESTRUCT PREMALG LES 2-14: CPT | Mod: 59

## 2023-09-18 PROCEDURE — 17000 DESTRUCT PREMALG LESION: CPT | Mod: 59

## 2023-09-18 PROCEDURE — OTHER TREATMENT REGIMEN: OTHER

## 2023-09-18 PROCEDURE — OTHER SUTURE REMOVAL (GLOBAL PERIOD): OTHER

## 2023-09-18 PROCEDURE — OTHER PRESCRIPTION: OTHER

## 2023-09-18 PROCEDURE — 99213 OFFICE O/P EST LOW 20 MIN: CPT | Mod: 25

## 2023-09-18 PROCEDURE — OTHER COUNSELING: OTHER

## 2023-09-18 RX ORDER — CLOBETASOL PROPIONATE 0.5 MG/G
OINTMENT TOPICAL
Qty: 60 | Refills: 1 | Status: ERX | COMMUNITY
Start: 2023-09-18

## 2023-09-18 RX ORDER — TRIAMCINOLONE ACETONIDE 0.25 MG/G
OINTMENT TOPICAL
Qty: 80 | Refills: 1 | Status: ERX | COMMUNITY
Start: 2023-09-18

## 2023-09-18 ASSESSMENT — LOCATION SIMPLE DESCRIPTION DERM
LOCATION SIMPLE: RIGHT UPPER ARM
LOCATION SIMPLE: LEFT AXILLARY VAULT
LOCATION SIMPLE: CHEST
LOCATION SIMPLE: RIGHT AXILLARY VAULT
LOCATION SIMPLE: LEFT THIGH
LOCATION SIMPLE: LEFT UPPER ARM
LOCATION SIMPLE: NECK
LOCATION SIMPLE: RIGHT CHEEK
LOCATION SIMPLE: LEFT UPPER BACK
LOCATION SIMPLE: GROIN
LOCATION SIMPLE: LEFT CHEEK
LOCATION SIMPLE: RIGHT THIGH
LOCATION SIMPLE: RIGHT UPPER BACK

## 2023-09-18 ASSESSMENT — LOCATION ZONE DERM
LOCATION ZONE: LEG
LOCATION ZONE: AXILLAE
LOCATION ZONE: TRUNK
LOCATION ZONE: NECK
LOCATION ZONE: FACE
LOCATION ZONE: ARM

## 2023-09-18 ASSESSMENT — LOCATION DETAILED DESCRIPTION DERM
LOCATION DETAILED: RIGHT ANTERIOR PROXIMAL UPPER ARM
LOCATION DETAILED: LEFT ANTERIOR PROXIMAL UPPER ARM
LOCATION DETAILED: LEFT ANTERIOR PROXIMAL THIGH
LOCATION DETAILED: RIGHT INFERIOR LATERAL MALAR CHEEK
LOCATION DETAILED: RIGHT SUPERIOR LATERAL MALAR CHEEK
LOCATION DETAILED: LEFT INGUINAL CREASE
LOCATION DETAILED: LEFT MID-UPPER BACK
LOCATION DETAILED: LEFT SUPERIOR PREAURICULAR CHEEK
LOCATION DETAILED: MIDDLE STERNUM
LOCATION DETAILED: RIGHT AXILLARY VAULT
LOCATION DETAILED: LEFT SUPERIOR LATERAL NECK
LOCATION DETAILED: RIGHT ANTERIOR PROXIMAL THIGH
LOCATION DETAILED: RIGHT INGUINAL CREASE
LOCATION DETAILED: RIGHT MID-UPPER BACK
LOCATION DETAILED: LEFT INFERIOR CENTRAL MALAR CHEEK
LOCATION DETAILED: LEFT AXILLARY VAULT

## 2023-09-18 NOTE — PROCEDURE: SUTURE REMOVAL (GLOBAL PERIOD)
Detail Level: Detailed
Add 12793 Cpt? (Important Note: In 2017 The Use Of 29444 Is Being Tracked By Cms To Determine Future Global Period Reimbursement For Global Periods): no

## 2023-09-18 NOTE — PROCEDURE: MEDICATION COUNSELING
Xelwaltz Pregnancy And Lactation Text: This medication is Pregnancy Category D and is not considered safe during pregnancy.  The risk during breast feeding is also uncertain.

## 2023-09-18 NOTE — PROCEDURE: LIQUID NITROGEN
Detail Level: Detailed
Show Aperture Variable?: Yes
Consent: The patient's consent was obtained including but not limited to risks of crusting, scabbing, blistering, scarring, darker or lighter pigmentary change, recurrence, incomplete removal and infection.
Duration Of Freeze Thaw-Cycle (Seconds): 3
Render Note In Bullet Format When Appropriate: No
Number Of Freeze-Thaw Cycles: 2 freeze-thaw cycles
Application Tool (Optional): Liquid Nitrogen Sprayer
Post-Care Instructions: I reviewed with the patient in detail post-care instructions. Patient is to wear sunprotection, and avoid picking at any of the treated lesions. Pt may apply Vaseline to crusted or scabbing areas.
Medical Necessity Clause: This procedure was medically necessary because the lesions that were treated were:
Duration Of Freeze Thaw-Cycle (Seconds): 5-10
Spray Paint Text: The liquid nitrogen was applied to the skin utilizing a spray paint frosting technique.
Medical Necessity Information: It is in your best interest to select a reason for this procedure from the list below. All of these items fulfill various CMS LCD requirements except the new and changing color options.

## 2023-09-18 NOTE — PROCEDURE: TREATMENT REGIMEN
Detail Level: Zone
Initiate Treatment: clobetasol 0.05 % topical ointment \\napply BID to AA on the torso, arms and legs for up to 2 weeks, then stop.  avoid the face, groin, and skin folds\\n\\ntriamcinolone acetonide 0.025 % topical ointment \\nApply to groin and armpit area once daily.

## 2023-09-18 NOTE — PROCEDURE: MEDICATION COUNSELING
[Fatigue] : fatigue [Obesity] : obesity [Thyroid Disease] : thyroid disease [Depression] : depression [Anxious] : anxious [Negative] : Neurologic [Arthralgias] : arthralgias [Nasal Congestion] : no nasal congestion [Postnasal Drip] : no postnasal drip [Shortness Of Breath] : no shortness of breath [Chest Pain] : no chest pain [Palpitations] : no palpitations [Edema] : ~T edema was not present [Diabetes] : no diabetes  [Anemia] : no anemia [History of Iron Deficiency] : no history of iron deficiency [Heartburn] : no heartburn [Nocturia] : no nocturia [Difficulty Initiating Sleep] : no difficulty falling asleep [Difficulty Maintaining Sleep] : no difficulty maintaining sleep [Lower Extremity Discomfort] : no lower extremity discomfort [Unusual Sleep Behavior] : no unusual sleep behavior [FreeTextEntry8] : Covid+ in March 2021 [de-identified] : night sweats with menopause [de-identified] : on Lexapro 10 mg qd [de-identified] : clenches teeth [FreeTextEntry1] : 12 am  (may be as late as 3 am) [FreeTextEntry2] : 8-9 am [FreeTextEntry3] : 30-minutes [FreeTextEntry4] : 2 [FreeTextEntry5] : brief [FreeTextEntry6] : 6-7 hours [FreeTextEntry7] : 3-hours in the afternoon 2-3 times weekly Acitretin Pregnancy And Lactation Text: This medication is Pregnancy Category X and should not be given to women who are pregnant or may become pregnant in the future. This medication is excreted in breast milk.

## 2023-09-18 NOTE — PROCEDURE: MEDICATION COUNSELING
Addended by: ANTHONY GODINEZ on: 5/3/2021 05:09 PM     Modules accepted: Orders     Dapsone Counseling: I discussed with the patient the risks of dapsone including but not limited to hemolytic anemia, agranulocytosis, rashes, methemoglobinemia, kidney failure, peripheral neuropathy, headaches, GI upset, and liver toxicity.  Patients who start dapsone require monitoring including baseline LFTs and weekly CBCs for the first month, then every month thereafter.  The patient verbalized understanding of the proper use and possible adverse effects of dapsone.  All of the patient's questions and concerns were addressed.

## 2023-09-18 NOTE — PROCEDURE: MEDICATION COUNSELING

## 2023-10-02 ENCOUNTER — APPOINTMENT (OUTPATIENT)
Dept: URBAN - METROPOLITAN AREA CLINIC 236 | Age: 73
Setting detail: DERMATOLOGY
End: 2023-10-02

## 2023-10-02 DIAGNOSIS — L92.0 GRANULOMA ANNULARE: ICD-10-CM

## 2023-10-02 PROCEDURE — OTHER COUNSELING: OTHER

## 2023-10-02 PROCEDURE — 99213 OFFICE O/P EST LOW 20 MIN: CPT

## 2023-10-02 PROCEDURE — OTHER PRESCRIPTION: OTHER

## 2023-10-02 RX ORDER — DESOXIMETASONE 2.5 MG/G
OINTMENT TOPICAL
Qty: 100 | Refills: 1 | Status: ERX | COMMUNITY
Start: 2023-10-02

## 2023-10-02 ASSESSMENT — LOCATION DETAILED DESCRIPTION DERM
LOCATION DETAILED: RIGHT ANTERIOR PROXIMAL THIGH
LOCATION DETAILED: LEFT RIB CAGE
LOCATION DETAILED: RIGHT INFERIOR MEDIAL UPPER BACK
LOCATION DETAILED: LEFT ANTERIOR MEDIAL PROXIMAL UPPER ARM
LOCATION DETAILED: RIGHT ANTERIOR PROXIMAL UPPER ARM
LOCATION DETAILED: RIGHT RIB CAGE

## 2023-10-02 ASSESSMENT — LOCATION SIMPLE DESCRIPTION DERM
LOCATION SIMPLE: RIGHT THIGH
LOCATION SIMPLE: RIGHT UPPER BACK
LOCATION SIMPLE: ABDOMEN
LOCATION SIMPLE: LEFT UPPER ARM
LOCATION SIMPLE: RIGHT UPPER ARM

## 2023-10-02 ASSESSMENT — LOCATION ZONE DERM
LOCATION ZONE: ARM
LOCATION ZONE: LEG
LOCATION ZONE: TRUNK

## 2023-10-02 ASSESSMENT — SEVERITY ASSESSMENT: SEVERITY: MODERATE

## 2023-10-02 NOTE — PROCEDURE: COUNSELING
Patient Specific Counseling (Will Not Stick From Patient To Patient): -\\nstart desoximetasone\\nadvised to apply to a small, test patch of skin x 3 days before applying to all AA\\nif still not improving in 2 weeks, or GA is continuing to spread, advised to FU with Dr. ABREU for consult
Detail Level: Zone

## 2023-10-18 ENCOUNTER — APPOINTMENT (OUTPATIENT)
Dept: URBAN - METROPOLITAN AREA CLINIC 236 | Age: 73
Setting detail: DERMATOLOGY
End: 2023-10-21

## 2023-10-18 DIAGNOSIS — L92.0 GRANULOMA ANNULARE: ICD-10-CM

## 2023-10-18 PROCEDURE — OTHER COUNSELING: OTHER

## 2023-10-18 PROCEDURE — OTHER MEDICATION COUNSELING: OTHER

## 2023-10-18 PROCEDURE — 99214 OFFICE O/P EST MOD 30 MIN: CPT

## 2023-10-18 PROCEDURE — OTHER PRESCRIPTION MEDICATION MANAGEMENT: OTHER

## 2023-10-18 PROCEDURE — OTHER PRESCRIPTION: OTHER

## 2023-10-18 PROCEDURE — OTHER ORDER TESTS: OTHER

## 2023-10-18 PROCEDURE — OTHER ADDITIONAL NOTES: OTHER

## 2023-10-18 RX ORDER — HYDROXYCHLOROQUINE SULFATE 200 MG/1
TABLET ORAL
Qty: 60 | Refills: 0 | Status: CANCELLED

## 2023-10-18 ASSESSMENT — LOCATION DETAILED DESCRIPTION DERM
LOCATION DETAILED: LEFT RIB CAGE
LOCATION DETAILED: RIGHT INFERIOR MEDIAL UPPER BACK
LOCATION DETAILED: RIGHT ANTERIOR PROXIMAL THIGH
LOCATION DETAILED: RIGHT RIB CAGE
LOCATION DETAILED: RIGHT ANTERIOR PROXIMAL UPPER ARM
LOCATION DETAILED: LEFT ANTERIOR MEDIAL PROXIMAL UPPER ARM

## 2023-10-18 ASSESSMENT — LOCATION SIMPLE DESCRIPTION DERM
LOCATION SIMPLE: ABDOMEN
LOCATION SIMPLE: RIGHT UPPER BACK
LOCATION SIMPLE: RIGHT UPPER ARM
LOCATION SIMPLE: RIGHT THIGH
LOCATION SIMPLE: LEFT UPPER ARM

## 2023-10-18 ASSESSMENT — LOCATION ZONE DERM
LOCATION ZONE: ARM
LOCATION ZONE: TRUNK
LOCATION ZONE: LEG

## 2023-10-18 NOTE — PROCEDURE: ADDITIONAL NOTES
Additional Notes: We discussed that GA can be difficult to treat. Discussed that monitoring the rash would be a reasonable option. Patient preferred to try an alternate treatment method. Discussed use of hydroxychloroquine with patient which has been reported to be helpful for disseminated GA. Patient elected proceed with HCQ to treat her GA.
Detail Level: Simple
Render Risk Assessment In Note?: no

## 2023-10-18 NOTE — PROCEDURE: PRESCRIPTION MEDICATION MANAGEMENT
Initiate Treatment: Plaquenil 200 mg tablet \\nTake 1 tablets PO BID
Detail Level: Zone
Render In Strict Bullet Format?: No

## 2023-10-18 NOTE — PROCEDURE: ORDER TESTS
Billing Type: Third-Party Bill Curvilinear Excision Additional Text (Leave Blank If You Do Not Want): The margin was drawn around the clinically apparent lesion.  A curvilinear shape was then drawn on the skin incorporating the lesion and margins.  Incisions were then made along these lines to the appropriate tissue plane and the lesion was extirpated.

## 2023-10-18 NOTE — PROCEDURE: MEDICATION COUNSELING
No cyanosis, clubbing or edema Erivedge Counseling- I discussed with the patient the risks of Erivedge including but not limited to nausea, vomiting, diarrhea, constipation, weight loss, changes in the sense of taste, decreased appetite, muscle spasms, and hair loss.  The patient verbalized understanding of the proper use and possible adverse effects of Erivedge.  All of the patient's questions and concerns were addressed.

## 2023-10-18 NOTE — PROCEDURE: MEDICATION COUNSELING
Spontaneous, unlabored and symmetrical Ilumya Counseling: I discussed with the patient the risks of tildrakizumab including but not limited to immunosuppression, malignancy, posterior leukoencephalopathy syndrome, and serious infections.  The patient understands that monitoring is required including a PPD at baseline and must alert us or the primary physician if symptoms of infection or other concerning signs are noted.

## 2023-10-21 RX ORDER — HYDROXYCHLOROQUINE SULFATE 200 MG/1
TABLET ORAL
Qty: 60 | Refills: 1 | Status: ERX | COMMUNITY
Start: 2023-10-21

## 2024-06-01 NOTE — TELEPHONE ENCOUNTER
Bilateral screening mammogram [facility] March 30, 2017.  Right breast postoperative fluid collection smaller and less dense.  Benign.  BI-RADS 2   (0) blue, pale
